# Patient Record
Sex: FEMALE | Race: WHITE | NOT HISPANIC OR LATINO | Employment: UNEMPLOYED | URBAN - METROPOLITAN AREA
[De-identification: names, ages, dates, MRNs, and addresses within clinical notes are randomized per-mention and may not be internally consistent; named-entity substitution may affect disease eponyms.]

---

## 2023-10-17 PROBLEM — M17.11 OSTEOARTHRITIS OF RIGHT KNEE: Status: ACTIVE | Noted: 2023-10-17

## 2023-10-17 PROBLEM — M25.561 PAIN IN RIGHT KNEE: Status: ACTIVE | Noted: 2023-10-17

## 2023-10-17 PROBLEM — I50.42 CHRONIC COMBINED SYSTOLIC AND DIASTOLIC CONGESTIVE HEART FAILURE (MULTI): Status: ACTIVE | Noted: 2023-10-17

## 2023-10-17 PROBLEM — F01.50 VASCULAR DEMENTIA WITHOUT BEHAVIORAL DISTURBANCE (MULTI): Status: ACTIVE | Noted: 2023-10-17

## 2023-10-17 PROBLEM — J30.2 SEASONAL ALLERGIES: Status: ACTIVE | Noted: 2023-10-17

## 2023-10-17 PROBLEM — G89.29 OTHER CHRONIC PAIN: Status: ACTIVE | Noted: 2023-10-17

## 2023-10-17 PROBLEM — F01.518 VASCULAR DEMENTIA WITH BEHAVIOR DISTURBANCE (MULTI): Status: ACTIVE | Noted: 2023-10-17

## 2023-10-17 PROBLEM — F51.05 INSOMNIA DUE TO OTHER MENTAL DISORDER: Status: ACTIVE | Noted: 2023-10-17

## 2023-10-17 PROBLEM — M17.0 ARTHRITIS OF BOTH KNEES: Status: ACTIVE | Noted: 2023-10-17

## 2023-10-17 PROBLEM — E11.9 DIABETES MELLITUS (MULTI): Status: ACTIVE | Noted: 2023-10-17

## 2023-10-17 PROBLEM — I48.19 PERSISTENT ATRIAL FIBRILLATION (MULTI): Status: ACTIVE | Noted: 2023-10-17

## 2023-10-17 PROBLEM — Z86.39 HISTORY OF HYPERGLYCEMIA: Status: ACTIVE | Noted: 2023-10-17

## 2023-10-17 PROBLEM — M17.12 OSTEOARTHRITIS OF LEFT KNEE: Status: ACTIVE | Noted: 2023-10-17

## 2023-10-17 PROBLEM — M25.562 PAIN IN LEFT KNEE: Status: ACTIVE | Noted: 2023-10-17

## 2023-10-17 PROBLEM — E78.5 HYPERLIPIDEMIA: Status: ACTIVE | Noted: 2023-10-17

## 2023-10-17 PROBLEM — F99 MENTAL DISORDER, NOT OTHERWISE SPECIFIED: Status: ACTIVE | Noted: 2023-10-17

## 2023-10-17 PROBLEM — R54 FRAIL ELDERLY: Status: ACTIVE | Noted: 2023-10-17

## 2023-10-17 PROBLEM — I83.893 VARICOSE VEINS OF BOTH LEGS WITH EDEMA: Status: ACTIVE | Noted: 2023-10-17

## 2023-10-17 PROBLEM — F99 INSOMNIA DUE TO OTHER MENTAL DISORDER: Status: ACTIVE | Noted: 2023-10-17

## 2023-10-17 PROBLEM — F41.8 SITUATIONAL ANXIETY: Status: ACTIVE | Noted: 2023-10-17

## 2023-10-17 PROBLEM — I48.92 ATRIAL FLUTTER (MULTI): Status: ACTIVE | Noted: 2023-10-17

## 2023-10-17 PROBLEM — K59.01 SLOW TRANSIT CONSTIPATION: Status: ACTIVE | Noted: 2023-10-17

## 2023-10-17 PROBLEM — E11.9 TYPE 2 DIABETES MELLITUS WITHOUT COMPLICATION (MULTI): Status: ACTIVE | Noted: 2023-10-17

## 2023-10-17 PROBLEM — I50.9 ACUTE ON CHRONIC HEART FAILURE (MULTI): Status: ACTIVE | Noted: 2023-10-17

## 2023-10-17 PROBLEM — I10 HYPERTENSION: Status: ACTIVE | Noted: 2023-10-17

## 2023-10-17 RX ORDER — QUETIAPINE FUMARATE 25 MG/1
25 TABLET, FILM COATED ORAL NIGHTLY
COMMUNITY

## 2023-10-17 RX ORDER — SPIRONOLACTONE 25 MG/1
25 TABLET ORAL DAILY
COMMUNITY

## 2023-10-17 RX ORDER — BUSPIRONE HYDROCHLORIDE 5 MG/1
5 TABLET ORAL 2 TIMES DAILY
COMMUNITY

## 2023-10-17 RX ORDER — GLIMEPIRIDE 4 MG/1
4 TABLET ORAL DAILY
COMMUNITY

## 2023-10-17 RX ORDER — LIDOCAINE HCL 4 G/100G
1 CREAM TOPICAL 3 TIMES DAILY
COMMUNITY
Start: 2022-02-05

## 2023-10-17 RX ORDER — TALC
3 POWDER (GRAM) TOPICAL NIGHTLY PRN
COMMUNITY

## 2023-10-17 RX ORDER — TORSEMIDE 20 MG/1
TABLET ORAL
COMMUNITY

## 2023-10-17 RX ORDER — DOCUSATE SODIUM 100 MG/1
100 CAPSULE, LIQUID FILLED ORAL DAILY PRN
COMMUNITY
Start: 2023-02-12

## 2023-10-17 RX ORDER — POTASSIUM CHLORIDE 20 MEQ/1
20 TABLET, EXTENDED RELEASE ORAL DAILY
COMMUNITY

## 2023-10-17 RX ORDER — LOSARTAN POTASSIUM 50 MG/1
50 TABLET ORAL DAILY
COMMUNITY

## 2023-10-17 RX ORDER — ADHESIVE BANDAGE
5 BANDAGE TOPICAL EVERY 6 HOURS
COMMUNITY

## 2023-10-17 RX ORDER — ACETAMINOPHEN 500 MG
500 TABLET ORAL
COMMUNITY

## 2023-10-17 RX ORDER — METOPROLOL TARTRATE 25 MG/1
25 TABLET, FILM COATED ORAL 2 TIMES DAILY
COMMUNITY

## 2023-12-10 PROBLEM — E04.1 THYROID NODULE: Status: ACTIVE | Noted: 2023-12-10

## 2023-12-10 PROBLEM — E55.9 VITAMIN D DEFICIENCY: Status: ACTIVE | Noted: 2023-12-10

## 2023-12-19 ENCOUNTER — OFFICE VISIT (OUTPATIENT)
Dept: PRIMARY CARE | Facility: CLINIC | Age: 88
End: 2023-12-19
Payer: MEDICARE

## 2023-12-19 DIAGNOSIS — I48.0 PAROXYSMAL ATRIAL FIBRILLATION (MULTI): Chronic | ICD-10-CM

## 2023-12-19 DIAGNOSIS — I11.0 HYPERTENSIVE HEART DISEASE WITH HEART FAILURE (MULTI): Chronic | ICD-10-CM

## 2023-12-19 DIAGNOSIS — F01.518 VASCULAR DEMENTIA WITH BEHAVIOR DISTURBANCE (MULTI): Chronic | ICD-10-CM

## 2023-12-19 DIAGNOSIS — J06.9 UPPER RESPIRATORY INFECTION WITH COUGH AND CONGESTION: Primary | ICD-10-CM

## 2023-12-19 PROCEDURE — 1126F AMNT PAIN NOTED NONE PRSNT: CPT | Performed by: NURSE PRACTITIONER

## 2023-12-19 PROCEDURE — 3074F SYST BP LT 130 MM HG: CPT | Performed by: NURSE PRACTITIONER

## 2023-12-19 PROCEDURE — 3078F DIAST BP <80 MM HG: CPT | Performed by: NURSE PRACTITIONER

## 2023-12-19 PROCEDURE — 99349 HOME/RES VST EST MOD MDM 40: CPT | Performed by: NURSE PRACTITIONER

## 2023-12-21 NOTE — PROGRESS NOTES
"Subjective   Patient ID: Roxanne Dodd is a 88 y.o. female who presents for No chief complaint on file..      Patient seen today in her private room at [facility]. She is awake and alert with appropriate awareness, pleasantly confused, poor historian, no longer ambulatory and is propelling self around in wheelchair. PMH: Post Covid-19, HTN, Gerd, Osteoarthritis, osteoporosis, OAB.      I was asked to see this patient today due to increased cough, congestion and overall lethargy and inability to get in and out of bed for the last seven days.  Reports that she feels as though she is getting worse instead of better.  Appetite has been poor, trying to stay hydrated.  Reports that coughing at times is keeping her awake at night.  Reports coughing up thick clear/tan secretions.  Denies shortness of breath, however reports that at times \"I cough so hard I can't catch my breath\".  She is no longer ambulatory and remains in her wheelchair throughout the day.  Requires assistance for all transfers.  Incontinent of bladder only.  Denies nausea or vomiting.  Multiple residents have had similar symptoms.  Covid19 test is negative.  Currently utilizing OTC mucinex with mild relief.  Denies chest pain, palpitations, tachycardia, and shortness of breath, no PND/orthopnea, or respiratory complaints for the patient. There is no fever, or chills. No nausea, vomiting, diarrhea, headaches, or vision changes or recent falls. There is no hematuria, dysuria, flank pain, or increased urgency.           Current Outpatient Medications:     acetaminophen (Tylenol) 500 mg tablet, Take 1 tablet (500 mg) by mouth., Disp: , Rfl:     apixaban (Eliquis) 5 mg tablet, Take 1 tablet (5 mg) by mouth 2 times a day., Disp: , Rfl:     busPIRone (Buspar) 5 mg tablet, Take 1 tablet (5 mg) by mouth 2 times a day., Disp: , Rfl:     diclofenac sodium 1 % kit, every 6 hours. as directed Externally Four times a day, Disp: , Rfl:     docusate sodium " (Colace) 100 mg capsule, Take 1 capsule (100 mg) by mouth once daily as needed for constipation., Disp: , Rfl:     glimepiride (Amaryl) 4 mg tablet, Take 1 tablet (4 mg) by mouth once daily., Disp: , Rfl:     lidocaine (lidocaine HCL) 4 % cream, Apply 0.1 g topically 3 times a day., Disp: , Rfl:     losartan (Cozaar) 50 mg tablet, Take 1 tablet (50 mg) by mouth once daily., Disp: , Rfl:     magnesium hydroxide (Milk of Magnesia) 400 mg/5 mL suspension, Take 5 mL by mouth every 6 hours. At least 4 hours between doses as needed Four times a day, Disp: , Rfl:     melatonin 3 mg tablet, Take 1 tablet (3 mg) by mouth as needed at bedtime for sleep., Disp: , Rfl:     metoprolol tartrate (Lopressor) 25 mg tablet, Take 1 tablet (25 mg) by mouth 2 times a day., Disp: , Rfl:     potassium chloride CR 20 mEq ER tablet, Take 1 tablet (20 mEq) by mouth once daily., Disp: , Rfl:     QUEtiapine (SEROquel) 25 mg tablet, Take 1 tablet (25 mg) by mouth once daily at bedtime., Disp: , Rfl:     spironolactone (Aldactone) 25 mg tablet, Take 1 tablet (25 mg) by mouth once daily., Disp: , Rfl:     torsemide (Demadex) 20 mg tablet, as directed Orally, Disp: , Rfl:      Review of Systems   Constitutional:  Positive for activity change and chills.   HENT:  Positive for congestion and sinus pressure. Negative for sneezing, sore throat and trouble swallowing.    Eyes:  Negative for discharge and redness.   Respiratory:  Positive for cough and wheezing. Negative for apnea, choking, chest tightness and shortness of breath.    Genitourinary:  Negative for difficulty urinating and frequency.   Neurological: Negative.    Psychiatric/Behavioral: Negative.         Objective   /68   Pulse 88   Temp 37.2 °C (99 °F) (Temporal)   Resp 16   SpO2 95%     Physical Exam  Constitutional:       Appearance: Normal appearance. She is ill-appearing.   HENT:      Head: Atraumatic.      Right Ear: Tympanic membrane normal.      Left Ear: Tympanic membrane  normal.      Nose: Congestion and rhinorrhea present.      Mouth/Throat:      Mouth: Mucous membranes are moist.      Pharynx: Oropharynx is clear.   Eyes:      Extraocular Movements: Extraocular movements intact.      Conjunctiva/sclera: Conjunctivae normal.      Pupils: Pupils are equal, round, and reactive to light.   Cardiovascular:      Rate and Rhythm: Normal rate and regular rhythm.      Pulses: Normal pulses.      Heart sounds: Normal heart sounds.   Pulmonary:      Effort: Pulmonary effort is normal. No respiratory distress.      Breath sounds: Wheezing present. No rhonchi.   Chest:      Chest wall: No tenderness.   Abdominal:      General: Bowel sounds are normal. There is no distension.      Palpations: Abdomen is soft.      Tenderness: There is no abdominal tenderness.   Musculoskeletal:         General: No swelling.      Cervical back: Neck supple. No tenderness.   Skin:     Coloration: Skin is pale.   Neurological:      Mental Status: She is alert and oriented to person, place, and time. Mental status is at baseline.       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       Sarai Martin, APRN-CNP

## 2023-12-22 VITALS
TEMPERATURE: 99 F | OXYGEN SATURATION: 95 % | DIASTOLIC BLOOD PRESSURE: 68 MMHG | HEART RATE: 88 BPM | SYSTOLIC BLOOD PRESSURE: 126 MMHG | RESPIRATION RATE: 16 BRPM

## 2023-12-22 PROBLEM — I48.0 PAROXYSMAL ATRIAL FIBRILLATION (MULTI): Status: ACTIVE | Noted: 2023-12-22

## 2023-12-22 PROBLEM — I11.0 HYPERTENSIVE HEART DISEASE WITH HEART FAILURE (MULTI): Status: ACTIVE | Noted: 2023-12-22

## 2023-12-22 PROBLEM — J06.9 UPPER RESPIRATORY INFECTION WITH COUGH AND CONGESTION: Status: ACTIVE | Noted: 2023-12-22

## 2023-12-22 ASSESSMENT — ENCOUNTER SYMPTOMS
CHOKING: 0
CHILLS: 1
ACTIVITY CHANGE: 1
FREQUENCY: 0
NEUROLOGICAL NEGATIVE: 1
WHEEZING: 1
SINUS PRESSURE: 1
SORE THROAT: 0
SHORTNESS OF BREATH: 0
DIFFICULTY URINATING: 0
PSYCHIATRIC NEGATIVE: 1
EYE DISCHARGE: 0
TROUBLE SWALLOWING: 0
COUGH: 1
CHEST TIGHTNESS: 0
EYE REDNESS: 0
APNEA: 0

## 2023-12-22 ASSESSMENT — PAIN SCALES - GENERAL: PAINLEVEL: 0-NO PAIN

## 2024-01-30 ENCOUNTER — OFFICE VISIT (OUTPATIENT)
Dept: PRIMARY CARE | Facility: CLINIC | Age: 89
End: 2024-01-30
Payer: MEDICARE

## 2024-01-30 VITALS
DIASTOLIC BLOOD PRESSURE: 68 MMHG | TEMPERATURE: 97.6 F | SYSTOLIC BLOOD PRESSURE: 126 MMHG | RESPIRATION RATE: 16 BRPM | OXYGEN SATURATION: 98 % | HEART RATE: 78 BPM

## 2024-01-30 DIAGNOSIS — F41.8 SITUATIONAL ANXIETY: ICD-10-CM

## 2024-01-30 DIAGNOSIS — J39.8 CONGESTION OF UPPER RESPIRATORY TRACT: ICD-10-CM

## 2024-01-30 DIAGNOSIS — I50.43 ACUTE ON CHRONIC COMBINED SYSTOLIC AND DIASTOLIC HEART FAILURE (MULTI): ICD-10-CM

## 2024-01-30 DIAGNOSIS — I48.4 ATYPICAL ATRIAL FLUTTER (MULTI): ICD-10-CM

## 2024-01-30 DIAGNOSIS — L89.221: ICD-10-CM

## 2024-01-30 DIAGNOSIS — I15.9 SECONDARY HYPERTENSION: Primary | ICD-10-CM

## 2024-01-30 DIAGNOSIS — M17.0 ARTHRITIS OF BOTH KNEES: ICD-10-CM

## 2024-01-30 PROCEDURE — 1125F AMNT PAIN NOTED PAIN PRSNT: CPT | Performed by: NURSE PRACTITIONER

## 2024-01-30 PROCEDURE — 3078F DIAST BP <80 MM HG: CPT | Performed by: NURSE PRACTITIONER

## 2024-01-30 PROCEDURE — 1157F ADVNC CARE PLAN IN RCRD: CPT | Performed by: NURSE PRACTITIONER

## 2024-01-30 PROCEDURE — 1159F MED LIST DOCD IN RCRD: CPT | Performed by: NURSE PRACTITIONER

## 2024-01-30 PROCEDURE — 99349 HOME/RES VST EST MOD MDM 40: CPT | Performed by: NURSE PRACTITIONER

## 2024-01-30 PROCEDURE — 1160F RVW MEDS BY RX/DR IN RCRD: CPT | Performed by: NURSE PRACTITIONER

## 2024-01-30 PROCEDURE — 3074F SYST BP LT 130 MM HG: CPT | Performed by: NURSE PRACTITIONER

## 2024-01-30 RX ORDER — MENTHOL AND ZINC OXIDE .44; 20.625 G/100G; G/100G
1 OINTMENT TOPICAL AS NEEDED
Qty: 60 G | Refills: 1 | Status: SHIPPED | OUTPATIENT
Start: 2024-01-30

## 2024-01-30 RX ORDER — GUAIFENESIN 600 MG/1
1200 TABLET, EXTENDED RELEASE ORAL 2 TIMES DAILY
Qty: 120 TABLET | Refills: 11 | Status: SHIPPED | OUTPATIENT
Start: 2024-01-30 | End: 2025-01-29

## 2024-01-30 ASSESSMENT — PAIN SCALES - GENERAL: PAINLEVEL: 4

## 2024-01-30 NOTE — PROGRESS NOTES
"Subjective   Patient ID: Rxoanne Dodd is a 88 y.o. female who presents for Follow-up (Thigh pain, rash).     Patient seen today in her private room at [facility]. She is awake and alert with appropriate awareness, pleasantly confused, poor historian, no longer ambulatory and is propelling self around in wheelchair. PMH: Post Covid-19, HTN, Gerd, Osteoarthritis, osteoporosis, OAB.    Patient had asked for an in person visit today.  She did not share with staff the nature of requested visit.  Patient is well known to this provider, and she reported \"I just feel comfortable sharing my concerns with you\".  Today she is having complaints of left posterior upper thigh discomfort.  She is describing as a burning sensation at time, and she noticed blood ting on her undergarments over the past several days.  States that she has cleaned herself up, however has not shared with staff her concerns.  She does appear to have a dime sized open wound, which is more of a skin tear from scooting on her bed and chair. Staff mentions that over the past three weeks she has refused her shower.  Patient states that \"it is to cold to shower\", and that's why she has chosen not to.  We've discussed risks and benefits of bathing and not bathing.  Another issue she has mentioned is her rash to arms and legs, which are more of an eczema type patches to her skin.  Currently using Triaminicol cream whish has helped, and has relieved the itching.  Appetite is reported as good.  Reports bowels as regular and is urinating adequate amounts.  Another issue she has mentioned is phlegm - reports that she is constantly coughing up thick phlegm which is white/non colored, but does state at times \"I just can't cough it up\".  She is capable of transferring on her own, requires assistance with dressing and bathing.  Otherwise is mainly independent with her ADL's.  Denies chest pain, palpitations, tachycardia, and shortness of breath, wheezing, no " PND/orthopnea, or respiratory complaints for the patient. There is no fever, or chills. No nausea, vomiting, diarrhea, headaches, or vision changes or recent falls. There is no hematuria, dysuria, flank pain, or increased urgency.           Current Outpatient Medications:     acetaminophen (Tylenol) 500 mg tablet, Take 1 tablet (500 mg) by mouth., Disp: , Rfl:     apixaban (Eliquis) 5 mg tablet, Take 1 tablet (5 mg) by mouth 2 times a day., Disp: , Rfl:     busPIRone (Buspar) 5 mg tablet, Take 1 tablet (5 mg) by mouth 2 times a day., Disp: , Rfl:     diclofenac sodium 1 % kit, every 6 hours. as directed Externally Four times a day, Disp: , Rfl:     docusate sodium (Colace) 100 mg capsule, Take 1 capsule (100 mg) by mouth once daily as needed for constipation., Disp: , Rfl:     glimepiride (Amaryl) 4 mg tablet, Take 1 tablet (4 mg) by mouth once daily., Disp: , Rfl:     guaiFENesin (Mucinex) 600 mg 12 hr tablet, Take 2 tablets (1,200 mg) by mouth 2 times a day. Do not crush, chew, or split., Disp: 120 tablet, Rfl: 11    lidocaine (lidocaine HCL) 4 % cream, Apply 0.1 g topically 3 times a day., Disp: , Rfl:     losartan (Cozaar) 50 mg tablet, Take 1 tablet (50 mg) by mouth once daily., Disp: , Rfl:     magnesium hydroxide (Milk of Magnesia) 400 mg/5 mL suspension, Take 5 mL by mouth every 6 hours. At least 4 hours between doses as needed Four times a day, Disp: , Rfl:     melatonin 3 mg tablet, Take 1 tablet (3 mg) by mouth as needed at bedtime for sleep., Disp: , Rfl:     menthol-zinc oxide (Calmoseptine) 0.44-20.6 % ointment, Apply 1 Application topically if needed for irritation., Disp: 60 g, Rfl: 1    metoprolol tartrate (Lopressor) 25 mg tablet, Take 1 tablet (25 mg) by mouth 2 times a day., Disp: , Rfl:     potassium chloride CR 20 mEq ER tablet, Take 1 tablet (20 mEq) by mouth once daily., Disp: , Rfl:     QUEtiapine (SEROquel) 25 mg tablet, Take 1 tablet (25 mg) by mouth once daily at bedtime., Disp: , Rfl:      spironolactone (Aldactone) 25 mg tablet, Take 1 tablet (25 mg) by mouth once daily., Disp: , Rfl:     torsemide (Demadex) 20 mg tablet, as directed Orally, Disp: , Rfl:      Review of Systems  Constitutional:  Negative for activity change   HENT:  Negative for sneezing, sore throat and trouble swallowing.    Eyes:  Negative for discharge and redness.   Respiratory:  Negative for apnea, choking, chest tightness and shortness of breath.    Genitourinary:  Negative for difficulty urinating and frequency.   Neurological: Negative.    Psychiatric/Behavioral: Negative.       Objective   /68   Pulse 78   Temp 36.4 °C (97.6 °F) (Temporal)   Resp 16   SpO2 98%     Physical Exam  Constitutional:       Appearance: Normal appearance.   HENT:      Head: Atraumatic.      Right Ear: Tympanic membrane normal.      Left Ear: Tympanic membrane normal.      Nose: clear     Mouth/Throat: clear      Mouth: Mucous membranes are moist.      Pharynx: Oropharynx is clear.   Eyes:      Extraocular Movements: Extraocular movements intact.      Conjunctiva/sclera: Conjunctivae normal.      Pupils: Pupils are equal, round, and reactive to light.   Cardiovascular:      Rate and Rhythm: Normal rate and regular rhythm.      Pulses: Normal pulses.      Heart sounds: Normal heart sounds.   Pulmonary:      Effort: Pulmonary effort is normal. No respiratory distress.      Breath sounds: lung sounds clear bilaterally  Chest:      Chest wall: No tenderness.   Abdominal:      General: Bowel sounds are normal. There is no distension.      Palpations: Abdomen is soft.      Tenderness: There is no abdominal tenderness.   Musculoskeletal:         General: No swelling.      Cervical back: Neck supple. No tenderness.   Skin:     Coloration: Skin is pale.   Neurological:      Mental Status: She is alert and oriented to person, place, and time. Mental status is at baseline.     Assessment/Plan   Problem List Items Addressed This Visit              ICD-10-CM    Hypertension - Primary I10    Arthritis of both knees M17.0    Situational anxiety F41.8    Acute on chronic heart failure (CMS/[facility]) I50.9    Atrial flutter (CMS/[facility]) I48.92    Congestion of upper respiratory tract J39.8    Relevant Medications    guaiFENesin (Mucinex) 600 mg 12 hr tablet     Other Visit Diagnoses         Codes    Pressure injury of left thigh, stage 1     L89.221    Relevant Medications    menthol-zinc oxide (Calmoseptine) 0.44-20.6 % ointment          More than 50% of time spent in face-to-face discussion @ a total of 40 minutes with this patient on counseling, coordination of care, collaboration with family and staff and review of medical records and diagnostics.   Daughter in agreement with POC    Upper thigh wound - Encouraged regular bathing - open area apply calmoseptine to open area, pad and protect - dressing to be changed daily.  Increased congestion - ok to start mucinex 600mg daily           Sarai Martin, APRN-CNP

## 2024-02-13 ENCOUNTER — OFFICE VISIT (OUTPATIENT)
Dept: PRIMARY CARE | Facility: CLINIC | Age: 89
End: 2024-02-13
Payer: MEDICARE

## 2024-02-13 DIAGNOSIS — I50.43 ACUTE ON CHRONIC COMBINED SYSTOLIC AND DIASTOLIC HEART FAILURE (MULTI): Primary | ICD-10-CM

## 2024-02-13 DIAGNOSIS — M17.0 ARTHRITIS OF BOTH KNEES: Chronic | ICD-10-CM

## 2024-02-13 DIAGNOSIS — I48.4 ATYPICAL ATRIAL FLUTTER (MULTI): Chronic | ICD-10-CM

## 2024-02-13 DIAGNOSIS — S31.000D SACRAL WOUND, SUBSEQUENT ENCOUNTER: ICD-10-CM

## 2024-02-13 DIAGNOSIS — K59.01 SLOW TRANSIT CONSTIPATION: Chronic | ICD-10-CM

## 2024-02-13 PROCEDURE — 1126F AMNT PAIN NOTED NONE PRSNT: CPT | Performed by: NURSE PRACTITIONER

## 2024-02-13 PROCEDURE — 99348 HOME/RES VST EST LOW MDM 30: CPT | Performed by: NURSE PRACTITIONER

## 2024-02-13 PROCEDURE — 1157F ADVNC CARE PLAN IN RCRD: CPT | Performed by: NURSE PRACTITIONER

## 2024-02-13 PROCEDURE — 3074F SYST BP LT 130 MM HG: CPT | Performed by: NURSE PRACTITIONER

## 2024-02-13 PROCEDURE — 1160F RVW MEDS BY RX/DR IN RCRD: CPT | Performed by: NURSE PRACTITIONER

## 2024-02-13 PROCEDURE — 1159F MED LIST DOCD IN RCRD: CPT | Performed by: NURSE PRACTITIONER

## 2024-02-13 PROCEDURE — 3078F DIAST BP <80 MM HG: CPT | Performed by: NURSE PRACTITIONER

## 2024-02-13 RX ORDER — TRIAMCINOLONE ACETONIDE 0.25 MG/G
1 CREAM TOPICAL 2 TIMES DAILY
COMMUNITY

## 2024-02-13 NOTE — PROGRESS NOTES
"Subjective   Patient ID: Roxanne Dodd is a 88 y.o. female who presents for Follow-up (Functional decline, worsening wound).    Patient seen today in her private room at [facility]. She is awake and alert with appropriate awareness, pleasantly confused at times, no longer ambulatory, unable to bear weight and is propelling self around in wheelchair. PMH: Post Covid-19, HTN, Gerd, Osteoarthritis, osteoporosis, OAB.    F2F - Wheelchair - Due to osteoarthritis of bilateral knees, patient is not ambulatory, no longer able to bear weight, history of falls and assist x1 person with all transfers.She can assist with transfer, however only to pivot.  Due to non-weight bearing she is not able to utilize cane or walker.  Depedent on all ADL's, Staff is able to assist with transporting wheelchair, and patient has the ability to propel herself around.      Patient seen today for follow up of skin rash, and sacral wound.  Reports that wound \"I think is getting better\".  Reports that she does have new depends which are softer and seems to fit better, thinks it has helped.  After assessment of wound, it actually appears slightly more open and not healing, coverage of wound is not consistent and is not relieving pressure to that area.   Admits to not keeping wound covered or sharing with staff \"that something felt pinchy\".  Rash to arms and legs remains about the same.  She reports using triamcinolone cream intermittently and is self administering.  Bathing has been on a regular scheduled basis. Denies chest pain, palpitations, tachycardia, and shortness of breath, wheezing, no PND/orthopnea, or respiratory complaints for the patient. There is no fever, or chills. No nausea, vomiting, diarrhea, headaches, or vision changes or recent falls. There is no hematuria, dysuria, flank pain, or increased urgency.         Current Outpatient Medications:     acetaminophen (Tylenol) 500 mg tablet, Take 1 tablet (500 mg) by mouth., " Disp: , Rfl:     apixaban (Eliquis) 5 mg tablet, Take 1 tablet (5 mg) by mouth 2 times a day., Disp: , Rfl:     busPIRone (Buspar) 5 mg tablet, Take 1 tablet (5 mg) by mouth 2 times a day., Disp: , Rfl:     diclofenac sodium 1 % kit, every 6 hours. as directed Externally Four times a day, Disp: , Rfl:     docusate sodium (Colace) 100 mg capsule, Take 1 capsule (100 mg) by mouth once daily as needed for constipation., Disp: , Rfl:     glimepiride (Amaryl) 4 mg tablet, Take 1 tablet (4 mg) by mouth once daily., Disp: , Rfl:     guaiFENesin (Mucinex) 600 mg 12 hr tablet, Take 2 tablets (1,200 mg) by mouth 2 times a day. Do not crush, chew, or split., Disp: 120 tablet, Rfl: 11    lidocaine (lidocaine HCL) 4 % cream, Apply 0.1 g topically 3 times a day., Disp: , Rfl:     losartan (Cozaar) 50 mg tablet, Take 1 tablet (50 mg) by mouth once daily., Disp: , Rfl:     magnesium hydroxide (Milk of Magnesia) 400 mg/5 mL suspension, Take 5 mL by mouth every 6 hours. At least 4 hours between doses as needed Four times a day, Disp: , Rfl:     melatonin 3 mg tablet, Take 1 tablet (3 mg) by mouth as needed at bedtime for sleep., Disp: , Rfl:     menthol-zinc oxide (Calmoseptine) 0.44-20.6 % ointment, Apply 1 Application topically if needed for irritation., Disp: 60 g, Rfl: 1    metoprolol tartrate (Lopressor) 25 mg tablet, Take 1 tablet (25 mg) by mouth 2 times a day., Disp: , Rfl:     potassium chloride CR 20 mEq ER tablet, Take 1 tablet (20 mEq) by mouth once daily., Disp: , Rfl:     QUEtiapine (SEROquel) 25 mg tablet, Take 1 tablet (25 mg) by mouth once daily at bedtime., Disp: , Rfl:     spironolactone (Aldactone) 25 mg tablet, Take 1 tablet (25 mg) by mouth once daily., Disp: , Rfl:     torsemide (Demadex) 20 mg tablet, as directed Orally, Disp: , Rfl:     triamcinolone (Kenalog) 0.025 % cream, Apply 1 Application topically 2 times a day., Disp: , Rfl:      Review of Systems  Constitutional:  Negative for activity change   HENT:   Negative for sneezing, sore throat and trouble swallowing.    Eyes:  Negative for discharge and redness.   Respiratory:  Negative for apnea, choking, chest tightness and shortness of breath.    Genitourinary:  Negative for difficulty urinating and frequency.   Neurological: Negative.    Psychiatric/Behavioral: Negative.  Constitutional:  Positive for activity change and chills.     Objective   /68   Pulse 66   Temp 36.1 °C (97 °F) (Temporal)   Resp 16   SpO2 97%     Physical Exam  Constitutional:       General: She is not in acute distress.     Appearance: Normal appearance. She is not ill-appearing.   HENT:      Head: Normocephalic and atraumatic.   Eyes:      Extraocular Movements: Extraocular movements intact.      Pupils: Pupils are equal, round, and reactive to light.   Cardiovascular:      Rate and Rhythm: Normal rate and regular rhythm.      Pulses: Normal pulses.      Heart sounds: Normal heart sounds.   Pulmonary:      Effort: Pulmonary effort is normal.      Breath sounds: Normal breath sounds.   Abdominal:      General: Bowel sounds are normal.      Palpations: Abdomen is soft.   Musculoskeletal:      Cervical back: Neck supple. No tenderness.   Skin:     Comments: Petechia bilateral arms and legs.  Wound to upper thigh open - appx 5cm around - tender - not bleeding no s/s of infection   Neurological:      Mental Status: She is alert.   Psychiatric:         Mood and Affect: Mood normal.         Assessment/Plan   Diagnoses and all orders for this visit:  Acute on chronic combined systolic and diastolic heart failure (CMS/HCC)  Comments:  managed with spironolactone 25mg daily, losartan 50mg daily, torsemide 20mg daily  Atypical atrial flutter (CMS/HCC)  Comments:  Managed - continue eliquis 5mg bid  Slow transit constipation  Comments:  Managed - continue colace 100mg daily  Sacral wound, subsequent encounter  Comments:  Continue to pad and protect - may consider gel cushion to relieve pressure  to area  Arthritis of both knees  Comments:  Wheelchair ordered for all transport and propelling self around      Great deal of education regarding shifting weight to relieve pressure to back of thigh.  Wound - Pad and protect with daily dressing changes and PRN - staff aware     Functional decline - wheelchair ordered from alexsandra DUCKWORTH Kityuli, APRN-CNP

## 2024-02-17 VITALS
DIASTOLIC BLOOD PRESSURE: 68 MMHG | OXYGEN SATURATION: 97 % | TEMPERATURE: 97 F | HEART RATE: 66 BPM | RESPIRATION RATE: 16 BRPM | SYSTOLIC BLOOD PRESSURE: 126 MMHG

## 2024-02-17 ASSESSMENT — PAIN SCALES - GENERAL: PAINLEVEL: 0-NO PAIN

## 2024-02-20 PROBLEM — S31.000D SACRAL WOUND, SUBSEQUENT ENCOUNTER: Status: ACTIVE | Noted: 2024-02-20

## 2024-02-21 ENCOUNTER — TELEPHONE (OUTPATIENT)
Dept: PRIMARY CARE | Facility: CLINIC | Age: 89
End: 2024-02-21
Payer: MEDICARE

## 2024-03-19 ENCOUNTER — OFFICE VISIT (OUTPATIENT)
Dept: PRIMARY CARE | Facility: CLINIC | Age: 89
End: 2024-03-19
Payer: MEDICARE

## 2024-03-19 DIAGNOSIS — M17.0 ARTHRITIS OF BOTH KNEES: Chronic | ICD-10-CM

## 2024-03-19 DIAGNOSIS — I15.9 SECONDARY HYPERTENSION: Chronic | ICD-10-CM

## 2024-03-19 DIAGNOSIS — I50.43 ACUTE ON CHRONIC COMBINED SYSTOLIC AND DIASTOLIC HEART FAILURE (MULTI): Primary | ICD-10-CM

## 2024-03-19 DIAGNOSIS — S31.000D SACRAL WOUND, SUBSEQUENT ENCOUNTER: Chronic | ICD-10-CM

## 2024-03-19 DIAGNOSIS — L20.84 INTRINSIC ECZEMA: Chronic | ICD-10-CM

## 2024-03-19 PROCEDURE — 3078F DIAST BP <80 MM HG: CPT | Performed by: NURSE PRACTITIONER

## 2024-03-19 PROCEDURE — 3075F SYST BP GE 130 - 139MM HG: CPT | Performed by: NURSE PRACTITIONER

## 2024-03-19 PROCEDURE — 1126F AMNT PAIN NOTED NONE PRSNT: CPT | Performed by: NURSE PRACTITIONER

## 2024-03-19 PROCEDURE — 1159F MED LIST DOCD IN RCRD: CPT | Performed by: NURSE PRACTITIONER

## 2024-03-19 PROCEDURE — 1157F ADVNC CARE PLAN IN RCRD: CPT | Performed by: NURSE PRACTITIONER

## 2024-03-19 PROCEDURE — 99348 HOME/RES VST EST LOW MDM 30: CPT | Performed by: NURSE PRACTITIONER

## 2024-03-19 PROCEDURE — 1160F RVW MEDS BY RX/DR IN RCRD: CPT | Performed by: NURSE PRACTITIONER

## 2024-03-21 VITALS
RESPIRATION RATE: 16 BRPM | DIASTOLIC BLOOD PRESSURE: 78 MMHG | OXYGEN SATURATION: 96 % | HEART RATE: 74 BPM | TEMPERATURE: 96.8 F | SYSTOLIC BLOOD PRESSURE: 136 MMHG

## 2024-03-21 ASSESSMENT — PAIN SCALES - GENERAL: PAINLEVEL: 0-NO PAIN

## 2024-03-21 NOTE — PROGRESS NOTES
"j\\Subjective   Patient ID: Roxanne Dodd is a 88 y.o. female who presents for No chief complaint on file..    Patient seen today in her private room at [facility]. She is awake and alert with appropriate awareness, pleasantly confused at times, no longer ambulatory, unable to bear weight and is propelling self around in wheelchair. PMH: Post Covid-19, HTN, Gerd, Osteoarthritis, osteoporosis, OAB.    I was asked to see this patient today due to reported increased \"itchy\" spots, and a \"pinching\" spot that has returned on the back of her thigh.  Dependent on all ADL's, requiring assistance for transferring.  Remains in her wheelchair most of the day and is able to propel herself around.  Continent of bowel and bladder does have intermittent episodes of urinary incontinence.  Spots appear to be from worsening eczema on her bilateral arms and hands.  Reports that she is using triaminiconlone cream daily as instructed.  Wound to thigh appears to have opened.  Does not appear to be bleeding at this time, however reports that she has noted small amounts of blood on her depends over the past week.  Appetite is reported as good.  Admits that she is sleeping well, and keeping herself busy.  Denies chest pain, palpitations, tachycardia, and shortness of breath, wheezing, no PND/orthopnea, or respiratory complaints for the patient. There is no fever, or chills. No nausea, vomiting, diarrhea, headaches, or vision changes or recent falls. There is no hematuria, dysuria, flank pain, or increased urgency.           Current Outpatient Medications:     acetaminophen (Tylenol) 500 mg tablet, Take 1 tablet (500 mg) by mouth., Disp: , Rfl:     apixaban (Eliquis) 5 mg tablet, Take 1 tablet (5 mg) by mouth 2 times a day., Disp: , Rfl:     busPIRone (Buspar) 5 mg tablet, Take 1 tablet (5 mg) by mouth 2 times a day., Disp: , Rfl:     diclofenac sodium 1 % kit, every 6 hours. as directed Externally Four times a day, Disp: , Rfl: "     docusate sodium (Colace) 100 mg capsule, Take 1 capsule (100 mg) by mouth once daily as needed for constipation., Disp: , Rfl:     glimepiride (Amaryl) 4 mg tablet, Take 1 tablet (4 mg) by mouth once daily., Disp: , Rfl:     guaiFENesin (Mucinex) 600 mg 12 hr tablet, Take 2 tablets (1,200 mg) by mouth 2 times a day. Do not crush, chew, or split., Disp: 120 tablet, Rfl: 11    lidocaine (lidocaine HCL) 4 % cream, Apply 0.1 g topically 3 times a day., Disp: , Rfl:     losartan (Cozaar) 50 mg tablet, Take 1 tablet (50 mg) by mouth once daily., Disp: , Rfl:     magnesium hydroxide (Milk of Magnesia) 400 mg/5 mL suspension, Take 5 mL by mouth every 6 hours. At least 4 hours between doses as needed Four times a day, Disp: , Rfl:     melatonin 3 mg tablet, Take 1 tablet (3 mg) by mouth as needed at bedtime for sleep., Disp: , Rfl:     menthol-zinc oxide (Calmoseptine) 0.44-20.6 % ointment, Apply 1 Application topically if needed for irritation., Disp: 60 g, Rfl: 1    metoprolol tartrate (Lopressor) 25 mg tablet, Take 1 tablet (25 mg) by mouth 2 times a day., Disp: , Rfl:     potassium chloride CR 20 mEq ER tablet, Take 1 tablet (20 mEq) by mouth once daily., Disp: , Rfl:     QUEtiapine (SEROquel) 25 mg tablet, Take 1 tablet (25 mg) by mouth once daily at bedtime., Disp: , Rfl:     spironolactone (Aldactone) 25 mg tablet, Take 1 tablet (25 mg) by mouth once daily., Disp: , Rfl:     torsemide (Demadex) 20 mg tablet, as directed Orally, Disp: , Rfl:     triamcinolone (Kenalog) 0.025 % cream, Apply 1 Application topically 2 times a day., Disp: , Rfl:      Review of Systems  Constitutional:  Negative for activity change   HENT:  Negative for sneezing, sore throat and trouble swallowing.    Eyes:  Negative for discharge and redness.   Respiratory:  Negative for apnea, choking, chest tightness and shortness of breath.    Genitourinary:  Negative for difficulty urinating and frequency.   Neurological: Negative.     Psychiatric/Behavioral: Negative.  Constitutional:  Positive for activity change and chills.   Objective   /78   Pulse 74   Temp 36 °C (96.8 °F)   Resp 16   SpO2 96%     Physical Exam  Constitutional:       General: She is not in acute distress.     Appearance: Normal appearance. She is not ill-appearing.   HENT:      Head: Normocephalic and atraumatic.   Eyes:      Extraocular Movements: Extraocular movements intact.      Pupils: Pupils are equal, round, and reactive to light.   Cardiovascular:      Rate and Rhythm: Normal rate and regular rhythm.      Pulses: Normal pulses.      Heart sounds: Normal heart sounds.   Pulmonary:      Effort: Pulmonary effort is normal.      Breath sounds: Normal breath sounds.   Abdominal:      General: Bowel sounds are normal.      Palpations: Abdomen is soft.   Musculoskeletal:      Cervical back: Neck supple. No tenderness.   Skin:     Comments: Petechia bilateral arms and legs.  Wound to upper thigh open - appx 5cm around - tender - not bleeding no s/s of infection   Neurological:      Mental Status: She is alert.   Psychiatric:         Mood and Affect: Mood normal.   Assessment/Plan   Diagnoses and all orders for this visit:  Acute on chronic combined systolic and diastolic heart failure (CMS/HCC)  Comments:  Managed - continue torsemide 20mg daily, spironolactone 25mg daily  Secondary hypertension  Comments:  Managed continue metoprolol 25mg daily, losartan 50mg daily  Arthritis of both knees  Comments:  Stable - no longera ambulatory - continue supportive care, tylenol 500mg Q6hrs prn  Sacral wound, subsequent encounter  Comments:  Keep area clean, dry, pad and protect daily  Intrinsic eczema  Comments:  Managed - continue triamcinolone cream bid               Sarai Martin, APRN-CNP

## 2024-05-06 NOTE — PROGRESS NOTES
Spoke to Serina at [facility] who states it was transferred to the [facility] d/t pt living in [place]. She reviewed chart but it is marked as cancelled. She states she will figure out what happened and get back to me.

## 2024-05-21 ENCOUNTER — OFFICE VISIT (OUTPATIENT)
Dept: PRIMARY CARE | Facility: CLINIC | Age: 89
End: 2024-05-21
Payer: MEDICARE

## 2024-05-21 DIAGNOSIS — M17.0 ARTHRITIS OF BOTH KNEES: Chronic | ICD-10-CM

## 2024-05-21 DIAGNOSIS — I50.42 CHRONIC COMBINED SYSTOLIC AND DIASTOLIC CONGESTIVE HEART FAILURE (MULTI): Chronic | ICD-10-CM

## 2024-05-21 DIAGNOSIS — I48.4 ATYPICAL ATRIAL FLUTTER (MULTI): Primary | ICD-10-CM

## 2024-05-21 DIAGNOSIS — E11.9 TYPE 2 DIABETES MELLITUS WITHOUT COMPLICATION, WITHOUT LONG-TERM CURRENT USE OF INSULIN (MULTI): Chronic | ICD-10-CM

## 2024-05-21 DIAGNOSIS — F01.50 VASCULAR DEMENTIA WITHOUT BEHAVIORAL DISTURBANCE (MULTI): ICD-10-CM

## 2024-05-21 PROCEDURE — 1160F RVW MEDS BY RX/DR IN RCRD: CPT | Performed by: NURSE PRACTITIONER

## 2024-05-21 PROCEDURE — 1126F AMNT PAIN NOTED NONE PRSNT: CPT | Performed by: NURSE PRACTITIONER

## 2024-05-21 PROCEDURE — 3078F DIAST BP <80 MM HG: CPT | Performed by: NURSE PRACTITIONER

## 2024-05-21 PROCEDURE — 3074F SYST BP LT 130 MM HG: CPT | Performed by: NURSE PRACTITIONER

## 2024-05-21 PROCEDURE — 1157F ADVNC CARE PLAN IN RCRD: CPT | Performed by: NURSE PRACTITIONER

## 2024-05-21 PROCEDURE — 1159F MED LIST DOCD IN RCRD: CPT | Performed by: NURSE PRACTITIONER

## 2024-05-21 PROCEDURE — 99348 HOME/RES VST EST LOW MDM 30: CPT | Performed by: NURSE PRACTITIONER

## 2024-05-21 ASSESSMENT — PAIN SCALES - GENERAL: PAINLEVEL: 0-NO PAIN

## 2024-05-21 NOTE — PROGRESS NOTES
Subjective   Patient ID: Roxanne Dodd is a 88 y.o. female who presents for Follow-up (new wheelchair, HTN, DMII).    Patient seen today in her private room at [facility]. She is awake and alert with appropriate awareness, pleasantly confused at times, no longer ambulatory, unable to bear weight and remains in transport chair. PMH: Post Covid-19, HTN, Gerd, Osteoarthritis, osteoporosis, OAB.    I was asked to see this patient today due to refusal of new wheelchair that has been received.  Patient is non-ambulatory and a risk for falls, due to weakness and knee pain.  Recently ordered a new wheelchair as she is using an older transport chair, and is dependent on staff to transport around facility.  Hopeful that a wheelchair would provide more independence and have the ability to propel herself. Today she is upset and reports that the wheelchair is uncomfortable and she will not use.  Notable that due to her hip girth when sitting that sides are touching, and she does not feel it is comfortable.  We've discussed benefits of utilizing chair, however she is not interested.   Suggestions of PT/OT, however patient is refusing.  Staff reports that she is normally cooperative, and pleasant.  Engaging in activities and meals with residents.  Bowels are reported as fairly regular, does have intermittent urinary incontinence and wears depends.  Reports that sacral wound has resolved, however admits to groin rash resolved with cream.  Denies chest pain, palpitations, tachycardia, and shortness of breath, wheezing, no PND/orthopnea, or respiratory complaints for the patient. There is no fever, or chills. No nausea, vomiting, diarrhea, headaches, or vision changes or recent falls. There is no hematuria, dysuria, flank pain, or increased urgency.           Current Outpatient Medications:     acetaminophen (Tylenol) 500 mg tablet, Take 1 tablet (500 mg) by mouth., Disp: , Rfl:     apixaban (Eliquis) 5 mg tablet, Take 1  tablet (5 mg) by mouth 2 times a day., Disp: , Rfl:     busPIRone (Buspar) 5 mg tablet, Take 1 tablet (5 mg) by mouth 2 times a day., Disp: , Rfl:     diclofenac sodium 1 % kit, every 6 hours. as directed Externally Four times a day, Disp: , Rfl:     docusate sodium (Colace) 100 mg capsule, Take 1 capsule (100 mg) by mouth once daily as needed for constipation., Disp: , Rfl:     glimepiride (Amaryl) 4 mg tablet, Take 1 tablet (4 mg) by mouth once daily., Disp: , Rfl:     guaiFENesin (Mucinex) 600 mg 12 hr tablet, Take 2 tablets (1,200 mg) by mouth 2 times a day. Do not crush, chew, or split., Disp: 120 tablet, Rfl: 11    lidocaine (lidocaine HCL) 4 % cream, Apply 0.1 g topically 3 times a day., Disp: , Rfl:     losartan (Cozaar) 50 mg tablet, Take 1 tablet (50 mg) by mouth once daily., Disp: , Rfl:     magnesium hydroxide (Milk of Magnesia) 400 mg/5 mL suspension, Take 5 mL by mouth every 6 hours. At least 4 hours between doses as needed Four times a day, Disp: , Rfl:     melatonin 3 mg tablet, Take 1 tablet (3 mg) by mouth as needed at bedtime for sleep., Disp: , Rfl:     menthol-zinc oxide (Calmoseptine) 0.44-20.6 % ointment, Apply 1 Application topically if needed for irritation., Disp: 60 g, Rfl: 1    metoprolol tartrate (Lopressor) 25 mg tablet, Take 1 tablet (25 mg) by mouth 2 times a day., Disp: , Rfl:     potassium chloride CR 20 mEq ER tablet, Take 1 tablet (20 mEq) by mouth once daily., Disp: , Rfl:     QUEtiapine (SEROquel) 25 mg tablet, Take 1 tablet (25 mg) by mouth once daily at bedtime., Disp: , Rfl:     spironolactone (Aldactone) 25 mg tablet, Take 1 tablet (25 mg) by mouth once daily., Disp: , Rfl:     torsemide (Demadex) 20 mg tablet, as directed Orally, Disp: , Rfl:     triamcinolone (Kenalog) 0.025 % cream, Apply 1 Application topically 2 times a day., Disp: , Rfl:      Review of Systems  HENT:  Negative for sneezing, sore throat and trouble swallowing.    Eyes:  Negative for discharge and  "redness.   Respiratory:  Negative for apnea, choking, chest tightness and shortness of breath.    Genitourinary:  Negative for difficulty urinating and frequency.   Neurological: Negative.    Psychiatric/Behavioral: Negative.  Constitutional:  Positive for activity change and chills.     Objective   /68 (BP Location: Left arm, Patient Position: Sitting)   Pulse 68   Temp 35.9 °C (96.7 °F) (Temporal)   Resp 16   Ht 1.727 m (5' 8\")   Wt 80.7 kg (178 lb)   SpO2 97%   BMI 27.06 kg/m²     Physical Exam    General: She is not in acute distress.     Appearance: Normal appearance. She is not ill-appearing.   HENT:      Head: Normocephalic and atraumatic.   Eyes:      Extraocular Movements: Extraocular movements intact.      Pupils: Pupils are equal, round, and reactive to light.   Cardiovascular:      Rate and Rhythm: Normal rate and regular rhythm.      Pulses: Normal pulses.      Heart sounds: Normal heart sounds.   Pulmonary:      Effort: Pulmonary effort is normal.      Breath sounds: Normal breath sounds.   Abdominal:      General: Bowel sounds are normal.      Palpations: Abdomen is soft.   Musculoskeletal:      Cervical back: Neck supple. No tenderness.   Skin:     Comments: Petechia bilateral arms and legs.    Neurological:      Mental Status: She is alert.   Psychiatric:         Mood and Affect: Mood normal.     Assessment/Plan   Diagnoses and all orders for this visit:  Atypical atrial flutter (Multi)  Comments:  Managed - continue eliquis 5mg bid  Chronic combined systolic and diastolic congestive heart failure (Multi)  Comments:  Managed - continue torsemide 20mg daily, potassium 20meq daily  Type 2 diabetes mellitus without complication, without long-term current use of insulin (Multi)  Comments:  Managed - continue glimepiride 4mg daily  Arthritis of both knees  Comments:  Continue use of wheelchair for all transport, supportive care, tylenol 500mg bid, lidocaine rub PRN  Vascular dementia without " behavioral disturbance (Multi)  Comments:  Continue quentiapine 25mg Qhs      More than 50% of time spent in face-to-face discussion @ a total of 30 minutes with this patient on counseling, coordination of care, collaboration with family and staff and review of medical records and diagnostics.  Collaboration with daughter regarding use of wheelchair - daughter and son will discuss with patient.         Sarai Martin, APRN-CNP

## 2024-05-27 VITALS
RESPIRATION RATE: 16 BRPM | TEMPERATURE: 96.7 F | HEIGHT: 68 IN | HEART RATE: 68 BPM | DIASTOLIC BLOOD PRESSURE: 68 MMHG | SYSTOLIC BLOOD PRESSURE: 126 MMHG | WEIGHT: 178 LBS | OXYGEN SATURATION: 97 % | BODY MASS INDEX: 26.98 KG/M2

## 2024-07-02 ENCOUNTER — OFFICE VISIT (OUTPATIENT)
Dept: PRIMARY CARE | Facility: CLINIC | Age: 89
End: 2024-07-02
Payer: MEDICARE

## 2024-07-02 DIAGNOSIS — M17.0 ARTHRITIS OF BOTH KNEES: Chronic | ICD-10-CM

## 2024-07-02 DIAGNOSIS — B36.9 FUNGAL RASH OF TORSO: Primary | ICD-10-CM

## 2024-07-02 DIAGNOSIS — F01.518 VASCULAR DEMENTIA WITH BEHAVIOR DISTURBANCE (MULTI): Chronic | ICD-10-CM

## 2024-07-02 PROCEDURE — 3078F DIAST BP <80 MM HG: CPT | Performed by: NURSE PRACTITIONER

## 2024-07-02 PROCEDURE — 99348 HOME/RES VST EST LOW MDM 30: CPT | Performed by: NURSE PRACTITIONER

## 2024-07-02 PROCEDURE — 1157F ADVNC CARE PLAN IN RCRD: CPT | Performed by: NURSE PRACTITIONER

## 2024-07-02 PROCEDURE — 3074F SYST BP LT 130 MM HG: CPT | Performed by: NURSE PRACTITIONER

## 2024-07-02 RX ORDER — NYSTATIN 100000 U/G
CREAM TOPICAL 2 TIMES DAILY
Qty: 60 G | Refills: 1 | Status: SHIPPED | OUTPATIENT
Start: 2024-07-02 | End: 2024-07-16

## 2024-07-02 ASSESSMENT — ENCOUNTER SYMPTOMS
CONSTITUTIONAL NEGATIVE: 1
RESPIRATORY NEGATIVE: 1
GASTROINTESTINAL NEGATIVE: 1
CARDIOVASCULAR NEGATIVE: 1
CONFUSION: 1
WEAKNESS: 1

## 2024-07-02 NOTE — PROGRESS NOTES
Subjective   Patient ID: Roxanne Dodd is a 89 y.o. female who presents for Follow-up (HTN, wound).    Patient seen today in her private room at [facility]. She is awake and alert with appropriate awareness, pleasantly confused at times, no longer ambulatory, unable to bear weight and remains in transport chair. PMH: Post Covid-19, HTN, Gerd, Osteoarthritis, osteoporosis, OAB.    Today I was asked to see Jazmyne due to a reported worsening wound to her thigh/abdominal folds.  This has been an ongoing issue, opted to refuse recommended suggestions for a new wheelchair with a cushioned seat.  Remains in her wheelchair the majority of her day.  She can propel herself short distances, however is dependent on staff for distance.  She does manage to transfer on her own at times.  Staff reports that she was found on the ground one week ago when she slid off of her bed attempting to transfer.  No injury sustained.  Reported abdominal wound actually appears to be more of a fungal type rash under abdominal folds right and left sides.  Contributing factor is the elastic band of pull up.  Staff reports that appetite has been great.  Bowels reported as good.  She does have some urinary incontinence that can be contributing to a worsening wound.  Remains in her wheelchair throughout the day propelling self around.  She is able to stand and transfer with assistance.  Staff reports that last week she was found on the floor attempting to transfer on her own.  Denies chest pain, palpitations, tachycardia, and shortness of breath, wheezing, no PND/orthopnea, or respiratory complaints for the patient. There is no fever, or chills. No nausea, vomiting, diarrhea, headaches, or vision changes or recent falls. There is no hematuria, dysuria, flank pain, or increased urgency.           Current Outpatient Medications:     acetaminophen (Tylenol) 500 mg tablet, Take 1 tablet (500 mg) by mouth., Disp: , Rfl:     apixaban (Eliquis)  5 mg tablet, Take 1 tablet (5 mg) by mouth 2 times a day., Disp: , Rfl:     busPIRone (Buspar) 5 mg tablet, Take 1 tablet (5 mg) by mouth 2 times a day., Disp: , Rfl:     diclofenac sodium 1 % kit, every 6 hours. as directed Externally Four times a day, Disp: , Rfl:     docusate sodium (Colace) 100 mg capsule, Take 1 capsule (100 mg) by mouth once daily as needed for constipation., Disp: , Rfl:     glimepiride (Amaryl) 4 mg tablet, Take 1 tablet (4 mg) by mouth once daily., Disp: , Rfl:     guaiFENesin (Mucinex) 600 mg 12 hr tablet, Take 2 tablets (1,200 mg) by mouth 2 times a day. Do not crush, chew, or split., Disp: 120 tablet, Rfl: 11    lidocaine (lidocaine HCL) 4 % cream, Apply 0.1 g topically 3 times a day., Disp: , Rfl:     losartan (Cozaar) 50 mg tablet, Take 1 tablet (50 mg) by mouth once daily., Disp: , Rfl:     magnesium hydroxide (Milk of Magnesia) 400 mg/5 mL suspension, Take 5 mL by mouth every 6 hours. At least 4 hours between doses as needed Four times a day, Disp: , Rfl:     melatonin 3 mg tablet, Take 1 tablet (3 mg) by mouth as needed at bedtime for sleep., Disp: , Rfl:     menthol-zinc oxide (Calmoseptine) 0.44-20.6 % ointment, Apply 1 Application topically if needed for irritation., Disp: 60 g, Rfl: 1    metoprolol tartrate (Lopressor) 25 mg tablet, Take 1 tablet (25 mg) by mouth 2 times a day., Disp: , Rfl:     nystatin (Mycostatin) cream, Apply topically 2 times a day for 14 days. apply to affected area, Disp: 60 g, Rfl: 1    potassium chloride CR 20 mEq ER tablet, Take 1 tablet (20 mEq) by mouth once daily., Disp: , Rfl:     QUEtiapine (SEROquel) 25 mg tablet, Take 1 tablet (25 mg) by mouth once daily at bedtime., Disp: , Rfl:     spironolactone (Aldactone) 25 mg tablet, Take 1 tablet (25 mg) by mouth once daily., Disp: , Rfl:     torsemide (Demadex) 20 mg tablet, as directed Orally, Disp: , Rfl:     triamcinolone (Kenalog) 0.025 % cream, Apply 1 Application topically 2 times a day., Disp: ,  Rfl:      Review of Systems   Constitutional: Negative.    HENT: Negative.     Respiratory: Negative.     Cardiovascular: Negative.    Gastrointestinal: Negative.    Genitourinary: Negative.    Musculoskeletal:  Positive for gait problem.   Neurological:  Positive for weakness.   Psychiatric/Behavioral:  Positive for confusion.        Objective   /64 (BP Location: Left arm, Patient Position: Sitting)   Pulse 68   Temp 36 °C (96.8 °F)   Resp 16   SpO2 96%     Physical Exam  General: She is not in acute distress.     Appearance: Normal appearance. She is not ill-appearing.   HENT:      Head: Normocephalic and atraumatic.   Eyes:      Extraocular Movements: Extraocular movements intact.      Pupils: Pupils are equal, round, and reactive to light.   Cardiovascular:      Rate and Rhythm: Normal rate and regular rhythm.      Pulses: Normal pulses.      Heart sounds: Normal heart sounds.   Pulmonary:      Effort: Pulmonary effort is normal.      Breath sounds: Normal breath sounds.   Abdominal:      General: Bowel sounds are normal.      Palpations: Abdomen is soft.   Musculoskeletal:      Cervical back: Neck supple. No tenderness.   Skin:     Comments: Petechia bilateral arms and legs.    Neurological:      Mental Status: She is alert.   Psychiatric:         Mood and Affect: Mood normal.     Assessment/Plan   Diagnoses and all orders for this visit:  Fungal rash of torso  Comments:  Start nystatin cream under abdomen bid x10 days or until improves  Orders:  -     nystatin (Mycostatin) cream; Apply topically 2 times a day for 14 days. apply to affected area  Vascular dementia with behavior disturbance (Multi)  Comments:  Continue with supportive care  Arthritis of both knees  Comments:  Worsening - continue with supportive care and pain control with aspercream and tylenol    More than 50% of time spent in face-to-face discussion @ a total of 30 minutes with this patient on counseling, coordination of care,  collaboration with family and staff and review of medical records and diagnostics.    Fungal rash abdominal folds - start nystatin cream bid.       Sarai Martin, APRN-CNP

## 2024-07-08 VITALS
TEMPERATURE: 96.8 F | SYSTOLIC BLOOD PRESSURE: 128 MMHG | OXYGEN SATURATION: 96 % | HEART RATE: 68 BPM | RESPIRATION RATE: 16 BRPM | DIASTOLIC BLOOD PRESSURE: 64 MMHG

## 2024-07-08 PROBLEM — B36.9 FUNGAL RASH OF TORSO: Status: ACTIVE | Noted: 2024-07-08

## 2024-08-06 ENCOUNTER — OFFICE VISIT (OUTPATIENT)
Dept: PRIMARY CARE | Facility: CLINIC | Age: 89
End: 2024-08-06
Payer: MEDICARE

## 2024-08-06 VITALS
RESPIRATION RATE: 16 BRPM | OXYGEN SATURATION: 95 % | TEMPERATURE: 97.5 F | DIASTOLIC BLOOD PRESSURE: 60 MMHG | HEART RATE: 68 BPM | SYSTOLIC BLOOD PRESSURE: 127 MMHG

## 2024-08-06 DIAGNOSIS — F01.50 VASCULAR DEMENTIA WITHOUT BEHAVIORAL DISTURBANCE (MULTI): Chronic | ICD-10-CM

## 2024-08-06 DIAGNOSIS — M17.0 ARTHRITIS OF BOTH KNEES: Chronic | ICD-10-CM

## 2024-08-06 DIAGNOSIS — I48.0 PAROXYSMAL ATRIAL FIBRILLATION (MULTI): Chronic | ICD-10-CM

## 2024-08-06 DIAGNOSIS — I50.43 ACUTE ON CHRONIC COMBINED SYSTOLIC AND DIASTOLIC HEART FAILURE (MULTI): Primary | ICD-10-CM

## 2024-08-06 DIAGNOSIS — E11.69 TYPE 2 DIABETES MELLITUS WITH OTHER SPECIFIED COMPLICATION, WITHOUT LONG-TERM CURRENT USE OF INSULIN (MULTI): ICD-10-CM

## 2024-08-06 DIAGNOSIS — I15.9 SECONDARY HYPERTENSION: Chronic | ICD-10-CM

## 2024-08-06 PROCEDURE — 3074F SYST BP LT 130 MM HG: CPT | Performed by: NURSE PRACTITIONER

## 2024-08-06 PROCEDURE — 3078F DIAST BP <80 MM HG: CPT | Performed by: NURSE PRACTITIONER

## 2024-08-06 PROCEDURE — 1160F RVW MEDS BY RX/DR IN RCRD: CPT | Performed by: NURSE PRACTITIONER

## 2024-08-06 PROCEDURE — 1157F ADVNC CARE PLAN IN RCRD: CPT | Performed by: NURSE PRACTITIONER

## 2024-08-06 PROCEDURE — 1159F MED LIST DOCD IN RCRD: CPT | Performed by: NURSE PRACTITIONER

## 2024-08-06 PROCEDURE — 99348 HOME/RES VST EST LOW MDM 30: CPT | Performed by: NURSE PRACTITIONER

## 2024-08-06 NOTE — PROGRESS NOTES
Subjective   Patient ID: Roxanne Dodd is a 89 y.o. female who presents for Follow-up (HTN, Afib).    Patient seen today in her private room at [facility]. She is awake and alert with appropriate awareness, pleasantly confused at times, no longer ambulatory, unable to bear weight and remains in transport chair. PMH: Post Covid-19, HTN, Gerd, Osteoarthritis, osteoporosis, OAB.    I was asked to see this patient as her blood pressures have been on the lower side more consistently and have been holding the evening dose.  She does not have any complaints of dizziness, headaches, or increased weakness.  Staff continues to monitor blood pressures bid prior to administering medications.  Appetite has remained good, bowels appear to be good.  She is incontinent of bladder only at times.  Wound to posterior thigh is open, however appears to be healing slowly.  Denies chest pain, palpitations, tachycardia, and shortness of breath, wheezing, no PND/orthopnea, or respiratory complaints for the patient. There is no fever, or chills. No nausea, vomiting, diarrhea, headaches, or vision changes or recent falls. There is no hematuria, dysuria, flank pain, or increased urgency.           Current Outpatient Medications:     acetaminophen (Tylenol) 500 mg tablet, Take 1 tablet (500 mg) by mouth., Disp: , Rfl:     apixaban (Eliquis) 5 mg tablet, Take 1 tablet (5 mg) by mouth 2 times a day., Disp: , Rfl:     busPIRone (Buspar) 5 mg tablet, Take 1 tablet (5 mg) by mouth 2 times a day., Disp: , Rfl:     diclofenac sodium 1 % kit, every 6 hours. as directed Externally Four times a day, Disp: , Rfl:     docusate sodium (Colace) 100 mg capsule, Take 1 capsule (100 mg) by mouth once daily as needed for constipation., Disp: , Rfl:     glimepiride (Amaryl) 4 mg tablet, Take 1 tablet (4 mg) by mouth once daily., Disp: , Rfl:     guaiFENesin (Mucinex) 600 mg 12 hr tablet, Take 2 tablets (1,200 mg) by mouth 2 times a day. Do not crush,  chew, or split., Disp: 120 tablet, Rfl: 11    lidocaine (lidocaine HCL) 4 % cream, Apply 0.1 g topically 3 times a day., Disp: , Rfl:     losartan (Cozaar) 50 mg tablet, Take 1 tablet (50 mg) by mouth once daily., Disp: , Rfl:     magnesium hydroxide (Milk of Magnesia) 400 mg/5 mL suspension, Take 5 mL by mouth every 6 hours. At least 4 hours between doses as needed Four times a day, Disp: , Rfl:     melatonin 3 mg tablet, Take 1 tablet (3 mg) by mouth as needed at bedtime for sleep., Disp: , Rfl:     menthol-zinc oxide (Calmoseptine) 0.44-20.6 % ointment, Apply 1 Application topically if needed for irritation., Disp: 60 g, Rfl: 1    metoprolol tartrate (Lopressor) 25 mg tablet, Take 1 tablet (25 mg) by mouth 2 times a day., Disp: , Rfl:     potassium chloride CR 20 mEq ER tablet, Take 1 tablet (20 mEq) by mouth once daily., Disp: , Rfl:     QUEtiapine (SEROquel) 25 mg tablet, Take 1 tablet (25 mg) by mouth once daily at bedtime., Disp: , Rfl:     spironolactone (Aldactone) 25 mg tablet, Take 1 tablet (25 mg) by mouth once daily., Disp: , Rfl:     torsemide (Demadex) 20 mg tablet, as directed Orally, Disp: , Rfl:     triamcinolone (Kenalog) 0.025 % cream, Apply 1 Application topically 2 times a day., Disp: , Rfl:      Review of Systems  Constitutional: Negative.    HENT: Negative.     Respiratory: Negative.     Cardiovascular: Negative.    Gastrointestinal: Negative.    Genitourinary: Negative.    Musculoskeletal:  Positive for gait problem.   Neurological:  Positive for weakness.   Psychiatric/Behavioral:  Positive for confusion.      Objective   /60   Pulse 68   Temp 36.4 °C (97.5 °F)   Resp 16   SpO2 95%     Physical Exam  General: She is not in acute distress.     Appearance: Normal appearance. She is not ill-appearing.   HENT:      Head: Normocephalic and atraumatic.   Eyes:      Extraocular Movements: Extraocular movements intact.      Pupils: Pupils are equal, round, and reactive to light.    Cardiovascular:      Rate and Rhythm: Normal rate and regular rhythm.      Pulses: Normal pulses.      Heart sounds: Normal heart sounds.   Pulmonary:      Effort: Pulmonary effort is normal.      Breath sounds: Normal breath sounds.   Abdominal:      General: Bowel sounds are normal.      Palpations: Abdomen is soft.   Musculoskeletal:      Cervical back: Neck supple. No tenderness.   Skin:     Comments: Petechia bilateral arms and legs.    Neurological:      Mental Status: She is alert.   Psychiatric:         Mood and Affect: Mood normal.     Assessment/Plan   Diagnoses and all orders for this visit:  Acute on chronic combined systolic and diastolic heart failure (Multi)  Comments:  Managed - continue torsemide 20mg daily and spironolactone 25mg daily  Secondary hypertension  Comments:  Reduce metoprolol to 12.5mg bid  Paroxysmal atrial fibrillation (Multi)  Comments:  Managed - continue eliquis 5mg bid  Type 2 diabetes mellitus with other specified complication, without long-term current use of insulin (Multi)  Comments:  managed - continue glimepiride 4mg daily - may consider jardiance 10mg daily if covered by insurance  Arthritis of both knees  Comments:  Continue supportive care - non ambulatory - remains in wheelchair propelling self around  Vascular dementia without behavioral disturbance (Multi)  Comments:  Managed - continue quetiapine 25mg Qhs and buspirone 5mg bid PRN    Hypotension/Afib - will reduce metoprolol to 12.5mg bid and continue to monitor.             FRANCIS Lubin-CNP

## 2025-01-21 ENCOUNTER — OFFICE VISIT (OUTPATIENT)
Dept: PRIMARY CARE | Facility: CLINIC | Age: 89
End: 2025-01-21
Payer: MEDICARE

## 2025-01-21 DIAGNOSIS — M17.0 ARTHRITIS OF BOTH KNEES: Chronic | ICD-10-CM

## 2025-01-21 DIAGNOSIS — E11.69 TYPE 2 DIABETES MELLITUS WITH OTHER SPECIFIED COMPLICATION, WITHOUT LONG-TERM CURRENT USE OF INSULIN: Chronic | ICD-10-CM

## 2025-01-21 DIAGNOSIS — S31.000D SACRAL WOUND, SUBSEQUENT ENCOUNTER: ICD-10-CM

## 2025-01-21 DIAGNOSIS — R54 FRAIL ELDERLY: ICD-10-CM

## 2025-01-21 DIAGNOSIS — I50.43 ACUTE ON CHRONIC COMBINED SYSTOLIC AND DIASTOLIC HEART FAILURE: Primary | Chronic | ICD-10-CM

## 2025-01-21 DIAGNOSIS — I48.0 PAROXYSMAL ATRIAL FIBRILLATION (MULTI): Chronic | ICD-10-CM

## 2025-01-21 DIAGNOSIS — F01.50 VASCULAR DEMENTIA WITHOUT BEHAVIORAL DISTURBANCE (MULTI): Chronic | ICD-10-CM

## 2025-01-21 PROCEDURE — 99348 HOME/RES VST EST LOW MDM 30: CPT | Performed by: NURSE PRACTITIONER

## 2025-01-21 PROCEDURE — 1160F RVW MEDS BY RX/DR IN RCRD: CPT | Performed by: NURSE PRACTITIONER

## 2025-01-21 PROCEDURE — 3074F SYST BP LT 130 MM HG: CPT | Performed by: NURSE PRACTITIONER

## 2025-01-21 PROCEDURE — 1159F MED LIST DOCD IN RCRD: CPT | Performed by: NURSE PRACTITIONER

## 2025-01-21 PROCEDURE — 1157F ADVNC CARE PLAN IN RCRD: CPT | Performed by: NURSE PRACTITIONER

## 2025-01-21 PROCEDURE — 3078F DIAST BP <80 MM HG: CPT | Performed by: NURSE PRACTITIONER

## 2025-01-28 VITALS
RESPIRATION RATE: 16 BRPM | BODY MASS INDEX: 26.67 KG/M2 | WEIGHT: 176 LBS | DIASTOLIC BLOOD PRESSURE: 68 MMHG | OXYGEN SATURATION: 95 % | HEIGHT: 68 IN | HEART RATE: 78 BPM | SYSTOLIC BLOOD PRESSURE: 128 MMHG | TEMPERATURE: 97.8 F

## 2025-01-28 NOTE — PROGRESS NOTES
Subjective   Patient ID: Roxanne Dodd is a 89 y.o. female who presents for Follow-up (Facility Annual H&P).    Patient seen today in her private room at [facility]. She is awake and alert with appropriate awareness, pleasantly confused at times, no longer ambulatory, unable to bear weight and remains in transport chair. PMH: Post Covid-19, HTN, Gerd, Osteoarthritis, osteoporosis, OAB.  She is mainly homebound due to age related functional decline.    Patient is well known to this provider.  She is actually doing fairly well.  Staff reports that her appetite is good, weights are stable at 176 lbs. Reports staying hydrated.  No longer ambulating and propels herself around via wheelchair.  Active within facility and does participate in activities and all meals.  Endorsing that she is sleeping well at night.  No recent falls reported by staff.  She does endorse a chronic small wound at the top of her upper thigh at the base of her left lower gluteus james.  Wound is a small round open area where she reports depends tend to rub and irritate.  She does have some urinary incontinence only.  Struggles with episodes of constipation relieved with current regimen of stool softeners.  Dependent on all ADL's, requiring assistance to transfer from chair to bed.  Daughter is active with her care.  Denies chest pain, palpitations, tachycardia, and shortness of breath, wheezing, no PND/orthopnea, or respiratory complaints for the patient. There is no fever, or chills. No nausea, vomiting, diarrhea, headaches, or vision changes or recent falls. There is no hematuria, dysuria, flank pain, or increased urgency.  Home Visit: Medically necessary due to: the office visit would require excessive physical effort or pain for the patient, Illness or condition that results in activity limitation or restriction that impacts the ability to leave home such as: unsteady gait/poor balance and limited resources for transportation and  support           Current Outpatient Medications:     acetaminophen (Tylenol) 500 mg tablet, Take 1 tablet (500 mg) by mouth., Disp: , Rfl:     apixaban (Eliquis) 5 mg tablet, Take 1 tablet (5 mg) by mouth 2 times a day., Disp: , Rfl:     busPIRone (Buspar) 5 mg tablet, Take 1 tablet (5 mg) by mouth 2 times a day., Disp: , Rfl:     diclofenac sodium 1 % kit, every 6 hours. as directed Externally Four times a day, Disp: , Rfl:     docusate sodium (Colace) 100 mg capsule, Take 1 capsule (100 mg) by mouth once daily as needed for constipation., Disp: , Rfl:     glimepiride (Amaryl) 4 mg tablet, Take 1 tablet (4 mg) by mouth once daily., Disp: , Rfl:     guaiFENesin (Mucinex) 600 mg 12 hr tablet, Take 2 tablets (1,200 mg) by mouth 2 times a day. Do not crush, chew, or split., Disp: 120 tablet, Rfl: 11    lidocaine (lidocaine HCL) 4 % cream, Apply 0.1 g topically 3 times a day., Disp: , Rfl:     losartan (Cozaar) 50 mg tablet, Take 1 tablet (50 mg) by mouth once daily., Disp: , Rfl:     magnesium hydroxide (Milk of Magnesia) 400 mg/5 mL suspension, Take 5 mL by mouth every 6 hours. At least 4 hours between doses as needed Four times a day, Disp: , Rfl:     melatonin 3 mg tablet, Take 1 tablet (3 mg) by mouth as needed at bedtime for sleep., Disp: , Rfl:     menthol-zinc oxide (Calmoseptine) 0.44-20.6 % ointment, Apply 1 Application topically if needed for irritation., Disp: 60 g, Rfl: 1    metoprolol tartrate (Lopressor) 25 mg tablet, Take 1 tablet (25 mg) by mouth 2 times a day., Disp: , Rfl:     potassium chloride CR 20 mEq ER tablet, Take 1 tablet (20 mEq) by mouth once daily., Disp: , Rfl:     QUEtiapine (SEROquel) 25 mg tablet, Take 1 tablet (25 mg) by mouth once daily at bedtime., Disp: , Rfl:     spironolactone (Aldactone) 25 mg tablet, Take 1 tablet (25 mg) by mouth once daily., Disp: , Rfl:     torsemide (Demadex) 20 mg tablet, as directed Orally, Disp: , Rfl:     triamcinolone (Kenalog) 0.025 % cream, Apply 1  "Application topically 2 times a day., Disp: , Rfl:      Review of Systems  Constitutional: Negative.    HENT: Negative.     Respiratory: Negative.     Cardiovascular: Negative.    Gastrointestinal: Negative.    Genitourinary: Negative.    Musculoskeletal:  Positive for gait problem.   Neurological:  Positive for weakness.   Psychiatric/Behavioral:  Positive for confusion    Objective   /68 (BP Location: Left arm, Patient Position: Sitting, BP Cuff Size: Adult)   Pulse 78   Temp 36.6 °C (97.8 °F) (Temporal)   Resp 16   Ht 1.727 m (5' 8\")   Wt 79.8 kg (176 lb)   SpO2 95%   BMI 26.76 kg/m²     Physical Exam  General: She is not in acute distress.     Appearance: Normal appearance. She is not ill-appearing.   HENT:      Head: Normocephalic and atraumatic.   Eyes:      Extraocular Movements: Extraocular movements intact.      Pupils: Pupils are equal, round, and reactive to light.   Cardiovascular:      Rate and Rhythm: Normal rate and regular rhythm.      Pulses: Normal pulses.      Heart sounds: Normal heart sounds.   Pulmonary:      Effort: Pulmonary effort is normal.      Breath sounds: Normal breath sounds.   Abdominal:      General: Bowel sounds are normal.      Palpations: Abdomen is soft.   Musculoskeletal:      Cervical back: Neck supple. No tenderness.   Skin:     Comments: Petechia bilateral arms and legs.    Neurological:      Mental Status: She is alert.   Psychiatric:         Mood and Affect: Mood normal.      Assessment/Plan   Diagnoses and all orders for this visit:  Acute on chronic combined systolic and diastolic heart failure  Comments:  Delicately stable - cont torsemide 20mg Qd, metoprolol 25mg Qd  Paroxysmal atrial fibrillation (Multi)  Comments:  Managed - continue eliquis 5mg daily  Type 2 diabetes mellitus with other specified complication, without long-term current use of insulin  Comments:  Delicately stable - cont glimepiride 4mg daily  Arthritis of both knees  Comments:  Continue " with supportive care - no longer ambulatory  Vascular dementia without behavioral disturbance (Multi)  Comments:  Stable with normal progression  Frail elderly  Sacral wound, subsequent encounter  Comments:  Continue to pad and protect      More than 50% of time spent in face-to-face discussion @ a total of 30 minutes with this patient on counseling, coordination of care, collaboration with family and staff and review of medical records and diagnostics.     Routine labs ordered; cbc, bmp, a1c    Sarai Martin, APRN-CNP

## 2025-03-18 ENCOUNTER — OFFICE VISIT (OUTPATIENT)
Dept: PRIMARY CARE | Facility: CLINIC | Age: 89
End: 2025-03-18
Payer: MEDICARE

## 2025-03-18 DIAGNOSIS — I50.42 CHRONIC COMBINED SYSTOLIC AND DIASTOLIC CONGESTIVE HEART FAILURE: Primary | ICD-10-CM

## 2025-03-18 DIAGNOSIS — E11.69 TYPE 2 DIABETES MELLITUS WITH OTHER SPECIFIED COMPLICATION, WITHOUT LONG-TERM CURRENT USE OF INSULIN: Chronic | ICD-10-CM

## 2025-03-18 DIAGNOSIS — K59.01 SLOW TRANSIT CONSTIPATION: Chronic | ICD-10-CM

## 2025-03-18 DIAGNOSIS — F01.50 VASCULAR DEMENTIA WITHOUT BEHAVIORAL DISTURBANCE (MULTI): Chronic | ICD-10-CM

## 2025-03-18 DIAGNOSIS — M17.0 ARTHRITIS OF BOTH KNEES: Chronic | ICD-10-CM

## 2025-03-18 DIAGNOSIS — I48.0 PAROXYSMAL ATRIAL FIBRILLATION (MULTI): Chronic | ICD-10-CM

## 2025-03-18 DIAGNOSIS — I15.9 SECONDARY HYPERTENSION: Chronic | ICD-10-CM

## 2025-03-18 PROCEDURE — 1160F RVW MEDS BY RX/DR IN RCRD: CPT | Performed by: NURSE PRACTITIONER

## 2025-03-18 PROCEDURE — 3074F SYST BP LT 130 MM HG: CPT | Performed by: NURSE PRACTITIONER

## 2025-03-18 PROCEDURE — 1157F ADVNC CARE PLAN IN RCRD: CPT | Performed by: NURSE PRACTITIONER

## 2025-03-18 PROCEDURE — 3078F DIAST BP <80 MM HG: CPT | Performed by: NURSE PRACTITIONER

## 2025-03-18 PROCEDURE — 1159F MED LIST DOCD IN RCRD: CPT | Performed by: NURSE PRACTITIONER

## 2025-03-18 PROCEDURE — 99348 HOME/RES VST EST LOW MDM 30: CPT | Performed by: NURSE PRACTITIONER

## 2025-03-24 VITALS
SYSTOLIC BLOOD PRESSURE: 126 MMHG | DIASTOLIC BLOOD PRESSURE: 70 MMHG | TEMPERATURE: 96.8 F | OXYGEN SATURATION: 95 % | HEART RATE: 68 BPM | RESPIRATION RATE: 16 BRPM

## 2025-03-24 NOTE — PROGRESS NOTES
"Subjective   Patient ID: Roxanne Dodd is a 89 y.o. female who presents for Follow-up (in assisted living/ home patient being seen at [facility] and evaluated for follow up / recent fall.).    Patient seen today in her private room at [facility]. She is awake and alert with appropriate awareness, pleasantly confused at times, no longer ambulatory, unable to bear weight and remains in transport chair. PMH: Post Covid-19, HTN, Gerd, Osteoarthritis, osteoporosis, OAB.  She is mainly homebound due to age related functional decline.     Patient is well known to this provider.  I was asked to see this patient due to increased weakness.  Staff reports that her appetite is good, weights are stable at 176 lbs. Reports staying hydrated.  No longer ambulating and propels herself around via wheelchair.  Active within facility and does participate in activities and all meals and most activities.  Endorsing that she is sleeping well at night.  No recent falls reported by staff.  She does endorse a chronic small wound at the top of her upper thigh at the base of her left lower gluteus james.  Wound is a small round open area where she reports depends tend to rub and irritate.  Currently treated with barrier cream and small foam bandage.  She does have some urinary incontinence only.  Struggles with episodes of constipation relieved with current regimen of stool softeners.  Dependent on all ADL's, requiring assistance to transfer from chair to bed.  Daughter is active with her care.  Staff reports that over the past three weeks she has refused taking showers.  This has actually been an ongoing incident, and patient states that it is too cold and \"I just didn't feel up to it\".  Reports that she does sponge bath and does not appear odorous.  However we did discuss incontinence and the importance of good pericare to avoid urinary tract infections.  She is in agreement to take a shower next time.  " Denies chest pain, palpitations, tachycardia, and shortness of breath, wheezing, no PND/orthopnea, or respiratory complaints for the patient. There is no fever, or chills. No nausea, vomiting, diarrhea, headaches, or vision changes or recent falls. There is no hematuria, dysuria, flank pain, or increased urgency.  Home Visit: Medically necessary due to: the office visit would require excessive physical effort or pain for the patient, Illness or condition that results in activity limitation or restriction that impacts the ability to leave home such as: unsteady gait/poor balance and limited resources for transportation and support                Current Outpatient Medications:     acetaminophen (Tylenol) 500 mg tablet, Take 1 tablet (500 mg) by mouth., Disp: , Rfl:     apixaban (Eliquis) 5 mg tablet, Take 1 tablet (5 mg) by mouth 2 times a day., Disp: , Rfl:     busPIRone (Buspar) 5 mg tablet, Take 1 tablet (5 mg) by mouth 2 times a day., Disp: , Rfl:     diclofenac sodium 1 % kit, every 6 hours. as directed Externally Four times a day, Disp: , Rfl:     docusate sodium (Colace) 100 mg capsule, Take 1 capsule (100 mg) by mouth once daily as needed for constipation., Disp: , Rfl:     glimepiride (Amaryl) 4 mg tablet, Take 1 tablet (4 mg) by mouth once daily., Disp: , Rfl:     lidocaine (lidocaine HCL) 4 % cream, Apply 0.1 g topically 3 times a day., Disp: , Rfl:     losartan (Cozaar) 50 mg tablet, Take 1 tablet (50 mg) by mouth once daily., Disp: , Rfl:     magnesium hydroxide (Milk of Magnesia) 400 mg/5 mL suspension, Take 5 mL by mouth every 6 hours. At least 4 hours between doses as needed Four times a day, Disp: , Rfl:     melatonin 3 mg tablet, Take 1 tablet (3 mg) by mouth as needed at bedtime for sleep., Disp: , Rfl:     menthol-zinc oxide (Calmoseptine) 0.44-20.6 % ointment, Apply 1 Application topically if needed for irritation., Disp: 60 g, Rfl: 1    metoprolol tartrate (Lopressor) 25 mg tablet, Take 1 tablet  (25 mg) by mouth 2 times a day., Disp: , Rfl:     potassium chloride CR 20 mEq ER tablet, Take 1 tablet (20 mEq) by mouth once daily., Disp: , Rfl:     QUEtiapine (SEROquel) 25 mg tablet, Take 1 tablet (25 mg) by mouth once daily at bedtime., Disp: , Rfl:     spironolactone (Aldactone) 25 mg tablet, Take 1 tablet (25 mg) by mouth once daily., Disp: , Rfl:     torsemide (Demadex) 20 mg tablet, as directed Orally, Disp: , Rfl:     triamcinolone (Kenalog) 0.025 % cream, Apply 1 Application topically 2 times a day., Disp: , Rfl:      Review of Systems  Constitutional: Negative.    HENT: Negative.     Respiratory: Negative.     Cardiovascular: Negative.    Gastrointestinal: Negative.    Genitourinary: Negative.    Musculoskeletal:  Positive for gait problem.   Neurological:  Positive for weakness.   Psychiatric/Behavioral:  Positive for confusion    Objective   /70 (BP Cuff Size: Adult)   Pulse 68   Temp 36 °C (96.8 °F) (Temporal)   Resp 16   SpO2 95%     Physical Exam  General: She is not in acute distress.     Appearance: Normal appearance. She is not ill-appearing.   HENT:      Head: Normocephalic and atraumatic.   Eyes:      Extraocular Movements: Extraocular movements intact.      Pupils: Pupils are equal, round, and reactive to light.   Cardiovascular:      Rate and Rhythm: Normal rate and regular rhythm.      Pulses: Normal pulses.      Heart sounds: Normal heart sounds.   Pulmonary:      Effort: Pulmonary effort is normal.      Breath sounds: Normal breath sounds.   Abdominal:      General: Bowel sounds are normal.      Palpations: Abdomen is soft.   Musculoskeletal:      Cervical back: Neck supple. No tenderness.   Skin:     Comments: Petechia bilateral arms and legs.    Neurological:      Mental Status: She is alert.   Psychiatric:         Mood and Affect: Mood normal.     Assessment/Plan   Diagnoses and all orders for this visit:  Chronic combined systolic and diastolic congestive heart  failure  Comments:  Managed - cont [id number] daily  Secondary hypertension  Comments:  Managed - cont losartan 50mg daily  Paroxysmal atrial fibrillation (Multi)  Comments:  Managed - cont eliquis 5mg daily  Type 2 diabetes mellitus with other specified complication, without long-term current use of insulin  Comments:  Managed - cont glimepride 4mg daily  Slow transit constipation  Comments:  Cont Milk of mag PRN  and daily colace PRN  Arthritis of both knees  Comments:  Managed with supportive care - cont daily tylenol and topical lidocain cream  Vascular dementia without behavioral disturbance (Multi)  Comments:  Managed - cont with seroquel Qhs and 1x daily prn    More than 50% of time spent in face-to-face discussion @ a total of 60 minutes with this patient on counseling, coordination of care, collaboration with family and staff and review of medical records and diagnostics.               Sarai Martin, APRN-CNP

## 2025-04-15 ENCOUNTER — OFFICE VISIT (OUTPATIENT)
Dept: PRIMARY CARE | Facility: CLINIC | Age: 89
End: 2025-04-15
Payer: MEDICARE

## 2025-04-15 DIAGNOSIS — E11.9 TYPE 2 DIABETES MELLITUS WITHOUT COMPLICATION, WITHOUT LONG-TERM CURRENT USE OF INSULIN: ICD-10-CM

## 2025-04-15 DIAGNOSIS — M17.0 ARTHRITIS OF BOTH KNEES: Chronic | ICD-10-CM

## 2025-04-15 DIAGNOSIS — I50.43 ACUTE ON CHRONIC COMBINED SYSTOLIC AND DIASTOLIC HEART FAILURE: Primary | ICD-10-CM

## 2025-04-15 DIAGNOSIS — L40.9 PSORIASIS AND SIMILAR DISORDER: ICD-10-CM

## 2025-04-15 DIAGNOSIS — I15.9 SECONDARY HYPERTENSION: Chronic | ICD-10-CM

## 2025-04-15 PROCEDURE — 3078F DIAST BP <80 MM HG: CPT | Performed by: NURSE PRACTITIONER

## 2025-04-15 PROCEDURE — 99349 HOME/RES VST EST MOD MDM 40: CPT | Performed by: NURSE PRACTITIONER

## 2025-04-15 PROCEDURE — 1159F MED LIST DOCD IN RCRD: CPT | Performed by: NURSE PRACTITIONER

## 2025-04-15 PROCEDURE — 1157F ADVNC CARE PLAN IN RCRD: CPT | Performed by: NURSE PRACTITIONER

## 2025-04-15 PROCEDURE — 3074F SYST BP LT 130 MM HG: CPT | Performed by: NURSE PRACTITIONER

## 2025-04-15 PROCEDURE — 1160F RVW MEDS BY RX/DR IN RCRD: CPT | Performed by: NURSE PRACTITIONER

## 2025-04-15 NOTE — PROGRESS NOTES
"Subjective   Patient ID: Roxanne Dodd is a 89 y.o. female who presents for Follow-up (in assisted living/ home patient being seen at [facility] and evaluated for rash and skin irritation).    Patient seen today in her private room at [facility]. She is awake and alert with appropriate awareness, pleasantly confused at times, no longer ambulatory, unable to bear weight and remains in transport chair. PMH: Post Covid-19, HTN, Afib, CHF, DMII, Gerd, Osteoarthritis, osteoporosis, OAB, age related short term memory loss.  She is mainly homebound due to age related functional decline.    I was asked to see this patient today for multiple chronic issues including skin dermatitis, increased bruising and functional decline.  Patient reports that she has noticed increased bruising to bilateral lower forearms.  Denies falls, or bumping into objects.  Noted a rather large bruising with a small bump on her right forearm.  Discussed that bruising is most likely from long term eliquis and due to age and thin skin that she does bruise quite easily.  Endorsing that she does not recall hitting her arm on anything.  Presents with dry patches to hands, elbows and knees, which actually appear to be more of a psoriasis not worsening and has slightly improved over the past several weeks with topical lotions and triaminicolone cream that she applies herself.  Staff does mention that she often refusing bathing, and she admits that it is because \"it is too cold, and gets chilled easily\".  Remains in her wheelchair throughout the day, legs dependent, staff transporting for meals, otherwise will propel herself around.  She is no longer able to bear weight and requires assistance for all ADL's.  Appetite reported as good, bowels noted as fairly regular.  She is incontinent of bladder only at times.  Denies chest pain, palpitations, tachycardia, and shortness of breath, wheezing, no PND/orthopnea, or respiratory " complaints for the patient. There is no fever, or chills. No nausea, vomiting, diarrhea, headaches, or vision changes or recent falls. There is no hematuria, dysuria, flank pain, or increased urgency.    Home Visit: Medically necessary due to: dementia/cognitive impairment, unsteady gait/poor balance, shortness of breath with minimal exertion, Illness or condition that results in activity limitation or restriction that impacts the ability to leave home such as: equipment and /or human assistance needed to safely leave the home, patient has limited support systems to help the patient attend office visits, the office visit would require excessive physical effort or pain for the patient.            Current Outpatient Medications:     acetaminophen (Tylenol) 500 mg tablet, Take 1 tablet (500 mg) by mouth., Disp: , Rfl:     apixaban (Eliquis) 5 mg tablet, Take 0.5 tablets (2.5 mg) by mouth 2 times a day., Disp: , Rfl:     busPIRone (Buspar) 5 mg tablet, Take 1 tablet (5 mg) by mouth 2 times a day as needed., Disp: , Rfl:     diclofenac sodium 1 % kit, every 6 hours. as directed Externally Four times a day, Disp: , Rfl:     docusate sodium (Colace) 100 mg capsule, Take 1 capsule (100 mg) by mouth once daily as needed for constipation., Disp: , Rfl:     glimepiride (Amaryl) 4 mg tablet, Take 1 tablet (4 mg) by mouth once daily., Disp: , Rfl:     losartan (Cozaar) 50 mg tablet, Take 1 tablet (50 mg) by mouth once daily., Disp: , Rfl:     magnesium hydroxide (Milk of Magnesia) 400 mg/5 mL suspension, Take 5 mL by mouth every 6 hours. At least 4 hours between doses as needed Four times a day, Disp: , Rfl:     melatonin 3 mg tablet, Take 1 tablet (3 mg) by mouth as needed at bedtime for sleep., Disp: , Rfl:     menthol-zinc oxide (Calmoseptine) 0.44-20.6 % ointment, Apply 1 Application topically if needed for irritation., Disp: 60 g, Rfl: 1    potassium chloride CR 20 mEq ER tablet, Take 1 tablet (20 mEq) by mouth once daily.,  "Disp: , Rfl:     QUEtiapine (SEROquel) 25 mg tablet, Take 1 tablet (25 mg) by mouth once daily at bedtime., Disp: , Rfl:     spironolactone (Aldactone) 25 mg tablet, Take 1 tablet (25 mg) by mouth once daily., Disp: , Rfl:     torsemide (Demadex) 20 mg tablet, as directed Orally, Disp: , Rfl:     triamcinolone (Kenalog) 0.025 % cream, Apply 1 Application topically 2 times a day., Disp: , Rfl:     metoprolol tartrate (Lopressor) 25 mg tablet, Take 0.5 tablets (12.5 mg) by mouth 2 times a day., Disp: , Rfl:      Review of Systems  Constitutional: Negative.    HENT: Negative.     Respiratory: Negative.     Cardiovascular: Negative.    Gastrointestinal: Negative.    Genitourinary: Negative.    Musculoskeletal:  Positive for gait problem.   Neurological:  Positive for weakness.   Psychiatric/Behavioral:  Positive for confusion    Objective   /78 (BP Location: Left arm, Patient Position: Sitting, BP Cuff Size: Adult)   Pulse 72   Temp 36 °C (96.8 °F) (Temporal)   Resp 16   Ht 1.702 m (5' 7\")   Wt 79.8 kg (176 lb)   SpO2 94%   BMI 27.57 kg/m²     Physical Exam  General: She is not in acute distress.     Appearance: Normal appearance. She is not ill-appearing.   HENT:      Head: Normocephalic and atraumatic.   Eyes:      Extraocular Movements: Extraocular movements intact.      Pupils: Pupils are equal, round, and reactive to light.   Cardiovascular:      Rate and Rhythm: Normal rate and regular rhythm.      Pulses: Normal pulses.      Heart sounds: Normal heart sounds.   Pulmonary:      Effort: Pulmonary effort is normal.      Breath sounds: Normal breath sounds.   Abdominal:      General: Bowel sounds are normal.      Palpations: Abdomen is soft.   Musculoskeletal:      Cervical back: Neck supple. No tenderness.   Skin:     Comments: Petechia bilateral arms and legs.    Neurological:      Mental Status: She is alert.   Psychiatric:         Mood and Affect: Mood normal.     Assessment/Plan   Diagnoses and all " orders for this visit:  Acute on chronic combined systolic and diastolic heart failure  Comments:  Managed - cont spironolactone 25mg daily, metoprolol 25mg daily, torsemide 20mg daily  Secondary hypertension  Comments:  Managed - cont losartan 50mg daily  Type 2 diabetes mellitus without complication, without long-term current use of insulin  Comments:  Managed cont glimepiride 4mg daily  Arthritis of both knees  Comments:  Managed - Resides in half-way, cont supportive care - assistance with all ADL's.  Psoriasis and similar disorder  Comments:  Managed - cont topical lotions, triamcinolone cream bid. eucerin daily to extremeties    More than 50% of time spent in face-to-face discussion @ a total of 40 minutes with this patient on counseling, coordination of care, and review of medical records and diagnostics. Advised pt to contact house calls office with any acute concerns or medication needs.              Sarai Martin, APRN-CNP

## 2025-04-22 VITALS
HEIGHT: 67 IN | HEART RATE: 72 BPM | BODY MASS INDEX: 27.62 KG/M2 | OXYGEN SATURATION: 94 % | DIASTOLIC BLOOD PRESSURE: 78 MMHG | TEMPERATURE: 96.8 F | WEIGHT: 176 LBS | RESPIRATION RATE: 16 BRPM | SYSTOLIC BLOOD PRESSURE: 116 MMHG

## 2025-04-22 PROBLEM — L40.9 PSORIASIS AND SIMILAR DISORDER: Status: ACTIVE | Noted: 2025-04-22

## 2025-04-29 ENCOUNTER — OFFICE VISIT (OUTPATIENT)
Dept: PRIMARY CARE | Facility: CLINIC | Age: 89
End: 2025-04-29
Payer: MEDICARE

## 2025-04-29 DIAGNOSIS — L40.9 PSORIASIS AND SIMILAR DISORDER: Chronic | ICD-10-CM

## 2025-04-29 DIAGNOSIS — F01.518 VASCULAR DEMENTIA WITH BEHAVIOR DISTURBANCE: ICD-10-CM

## 2025-04-29 DIAGNOSIS — E11.69 TYPE 2 DIABETES MELLITUS WITH OTHER SPECIFIED COMPLICATION, WITHOUT LONG-TERM CURRENT USE OF INSULIN: Chronic | ICD-10-CM

## 2025-04-29 DIAGNOSIS — I48.0 PAROXYSMAL ATRIAL FIBRILLATION (MULTI): Chronic | ICD-10-CM

## 2025-04-29 DIAGNOSIS — F41.8 SITUATIONAL ANXIETY: Chronic | ICD-10-CM

## 2025-04-29 DIAGNOSIS — I50.43 ACUTE ON CHRONIC COMBINED SYSTOLIC AND DIASTOLIC HEART FAILURE: Primary | ICD-10-CM

## 2025-04-29 DIAGNOSIS — I15.9 SECONDARY HYPERTENSION: Chronic | ICD-10-CM

## 2025-04-29 PROCEDURE — 3078F DIAST BP <80 MM HG: CPT | Performed by: NURSE PRACTITIONER

## 2025-04-29 PROCEDURE — 99348 HOME/RES VST EST LOW MDM 30: CPT | Performed by: NURSE PRACTITIONER

## 2025-04-29 PROCEDURE — 1159F MED LIST DOCD IN RCRD: CPT | Performed by: NURSE PRACTITIONER

## 2025-04-29 PROCEDURE — 1157F ADVNC CARE PLAN IN RCRD: CPT | Performed by: NURSE PRACTITIONER

## 2025-04-29 PROCEDURE — 3074F SYST BP LT 130 MM HG: CPT | Performed by: NURSE PRACTITIONER

## 2025-04-29 PROCEDURE — 1160F RVW MEDS BY RX/DR IN RCRD: CPT | Performed by: NURSE PRACTITIONER

## 2025-05-06 ENCOUNTER — TELEPHONE (OUTPATIENT)
Dept: PRIMARY CARE | Facility: CLINIC | Age: 89
End: 2025-05-06
Payer: MEDICARE

## 2025-05-06 VITALS
DIASTOLIC BLOOD PRESSURE: 68 MMHG | RESPIRATION RATE: 16 BRPM | HEART RATE: 78 BPM | TEMPERATURE: 97 F | OXYGEN SATURATION: 95 % | SYSTOLIC BLOOD PRESSURE: 126 MMHG

## 2025-05-06 DIAGNOSIS — E11.69 TYPE 2 DIABETES MELLITUS WITH OTHER SPECIFIED COMPLICATION, WITHOUT LONG-TERM CURRENT USE OF INSULIN: Primary | ICD-10-CM

## 2025-05-06 NOTE — PROGRESS NOTES
"Subjective   Patient ID: Roxanne Dodd is a 89 y.o. female who presents for Follow-up.    Patient seen today in her private room at [facility]. She is awake and alert with appropriate awareness, pleasantly confused at times, no longer ambulatory, unable to bear weight and remains in transport chair. PMH: Post Covid-19, HTN, Afib, CHF, DMII, Gerd, Osteoarthritis, osteoporosis, OAB, age related short term memory loss.  She is mainly homebound due to age related functional decline.     Patient requested visit today for multiple chronic issues including skin dermatitis, functional decline and questions regarding wheelchair.  She is asking about increased bruising on her arms, new bruising from lab draw two days ago.  Again we have discussed that bruising to bilateral arms is most likely from long term eliquis and due to age and thin skin that she does bruise quite easily.  Presents with dry patches to hands, elbows and knees, which actually appear to be more of a psoriasis not worsening and has slightly improved over the past several weeks with topical lotions and triaminicolone cream that she applies herself.  Patches have remained about the same, with some noted as healing.  Staff does reports that she has actually been cooperative with bathing and has not refused.  She is adamant about not using new wheelchair that was ordered for her and it remains sitting in the hallway.  In fact was upset that it was even ordered.  We discussed that her transport chair is old and is breaking down, and actually does not really fit in the chair, which can be contributing to some of her skin issues on her buttox. She verbailzed understanding, however still is not willing to use stating \"this chair is just fine\".   Staff using wheelchair to transport distances when needed.   Remains in her transport wheelchair throughout the day, legs dependent, staff transporting for meals, otherwise will propel herself around.  She is no " longer able to bear weight and requires assistance for all ADL's.  Appetite reported as good, bowels noted as fairly regular with current regimen.  She is incontinent of bladder only at times.  Denies chest pain, palpitations, tachycardia, and shortness of breath, wheezing, no PND/orthopnea, or respiratory complaints for the patient. There is no fever, or chills. No nausea, vomiting, diarrhea, headaches, or vision changes or recent falls. There is no hematuria, dysuria, flank pain, or increased urgency.     Home Visit: Medically necessary due to: dementia/cognitive impairment, unsteady gait/poor balance, shortness of breath with minimal exertion, Illness or condition that results in activity limitation or restriction that impacts the ability to leave home such as: equipment and /or human assistance needed to safely leave the home, patient has limited support systems to help the patient attend office visits, the office visit would require excessive physical effort or pain for the patient.            Current Medications[1]     acetaminophen (Tylenol) 500 mg tablet, Take 1 tablet (500 mg) by mouth., Disp: , Rfl:     apixaban (Eliquis) 5 mg tablet, Take 0.5 tablets (2.5 mg) by mouth 2 times a day., Disp: , Rfl:     busPIRone (Buspar) 5 mg tablet, Take 1 tablet (5 mg) by mouth 2 times a day as needed., Disp: , Rfl:     diclofenac sodium 1 % kit, every 6 hours. as directed Externally Four times a day, Disp: , Rfl:     docusate sodium (Colace) 100 mg capsule, Take 1 capsule (100 mg) by mouth once daily as needed for constipation., Disp: , Rfl:     glimepiride (Amaryl) 4 mg tablet, Take 1 tablet (4 mg) by mouth once daily., Disp: , Rfl:     losartan (Cozaar) 50 mg tablet, Take 1 tablet (50 mg) by mouth once daily., Disp: , Rfl:     magnesium hydroxide (Milk of Magnesia) 400 mg/5 mL suspension, Take 5 mL by mouth every 6 hours. At least 4 hours between doses as needed Four times a day, Disp: , Rfl:     melatonin 3 mg tablet,  Take 1 tablet (3 mg) by mouth as needed at bedtime for sleep., Disp: , Rfl:     menthol-zinc oxide (Calmoseptine) 0.44-20.6 % ointment, Apply 1 Application topically if needed for irritation., Disp: 60 g, Rfl: 1    metoprolol tartrate (Lopressor) 25 mg tablet, Take 0.5 tablets (12.5 mg) by mouth 2 times a day., Disp: , Rfl:     potassium chloride CR 20 mEq ER tablet, Take 1 tablet (20 mEq) by mouth once daily., Disp: , Rfl:     QUEtiapine (SEROquel) 25 mg tablet, Take 1 tablet (25 mg) by mouth once daily at bedtime., Disp: , Rfl:     spironolactone (Aldactone) 25 mg tablet, Take 1 tablet (25 mg) by mouth once daily., Disp: , Rfl:     torsemide (Demadex) 20 mg tablet, as directed Orally, Disp: , Rfl:     triamcinolone (Kenalog) 0.025 % cream, Apply 1 Application topically 2 times a day., Disp: , Rfl:     Review of Systems  Constitutional: Negative.    HENT: Negative.     Respiratory: Negative.     Cardiovascular: Negative.    Gastrointestinal: Negative.    Genitourinary: Negative.    Musculoskeletal:  Positive for gait problem.   Neurological:  Positive for weakness.   Psychiatric/Behavioral:  Positive for confusion    Objective   /68   Pulse 78   Temp 36.1 °C (97 °F) (Temporal)   Resp 16   SpO2 95%     Physical Exam  General: She is not in acute distress.     Appearance: Normal appearance. She is not ill-appearing.   HENT:      Head: Normocephalic and atraumatic.   Eyes:      Extraocular Movements: Extraocular movements intact.      Pupils: Pupils are equal, round, and reactive to light.   Cardiovascular:      Rate and Rhythm: Normal rate and regular rhythm.      Pulses: Normal pulses.      Heart sounds: Normal heart sounds.   Pulmonary:      Effort: Pulmonary effort is normal.      Breath sounds: Normal breath sounds.   Abdominal:      General: Bowel sounds are normal.      Palpations: Abdomen is soft.   Musculoskeletal:      Cervical back: Neck supple. No tenderness.   Skin:     Comments: Petechia  bilateral arms and legs.  Significant busing bilateral arms  Neurological:      Mental Status: She is alert.   Psychiatric:         Mood and Affect: Mood normal.     Last A1c - January 2025 7.7    Assessment/Plan   Diagnoses and all orders for this visit:  Acute on chronic combined systolic and diastolic heart failure  Comments:  Managed - cont torsemide 20 mg daily, spironolactone 25mg daily  Secondary hypertension  Comments:  Managed - cont metoprolol 12.5mg bid, losartan 50mg daily  Paroxysmal atrial fibrillation (Multi)  Comments:  Managed cont eliquis 2.5mg bid  Type 2 diabetes mellitus with other specified complication, without long-term current use of insulin  Comments:  Stable - cont glimepiride 4mg daily  January 2025 a1c 7.7  Situational anxiety  Comments:  Managed - may use seroquel 25mg 1 x daily  Vascular dementia with behavior disturbance  Psoriasis and similar disorder  Comments:  Managed - cont use of triamicolone cream to affected areas    More than 50% of time spent in face-to-face discussion @ a total of 40 minutes with this patient on counseling, coordination of care, collaboration with family and staff and review of medical records and diagnostics. Collaboration with family regarding wheelchair    Routine labs ordered to be drawn at facility       Sarai S Kitchen, APRN-CNP        [1]   Current Outpatient Medications:     acetaminophen (Tylenol) 500 mg tablet, Take 1 tablet (500 mg) by mouth., Disp: , Rfl:     apixaban (Eliquis) 5 mg tablet, Take 0.5 tablets (2.5 mg) by mouth 2 times a day., Disp: , Rfl:     busPIRone (Buspar) 5 mg tablet, Take 1 tablet (5 mg) by mouth 2 times a day as needed., Disp: , Rfl:     diclofenac sodium 1 % kit, every 6 hours. as directed Externally Four times a day, Disp: , Rfl:     docusate sodium (Colace) 100 mg capsule, Take 1 capsule (100 mg) by mouth once daily as needed for constipation., Disp: , Rfl:     glimepiride (Amaryl) 4 mg tablet, Take 1 tablet (4 mg) by  mouth once daily., Disp: , Rfl:     losartan (Cozaar) 50 mg tablet, Take 1 tablet (50 mg) by mouth once daily., Disp: , Rfl:     magnesium hydroxide (Milk of Magnesia) 400 mg/5 mL suspension, Take 5 mL by mouth every 6 hours. At least 4 hours between doses as needed Four times a day, Disp: , Rfl:     melatonin 3 mg tablet, Take 1 tablet (3 mg) by mouth as needed at bedtime for sleep., Disp: , Rfl:     menthol-zinc oxide (Calmoseptine) 0.44-20.6 % ointment, Apply 1 Application topically if needed for irritation., Disp: 60 g, Rfl: 1    metoprolol tartrate (Lopressor) 25 mg tablet, Take 0.5 tablets (12.5 mg) by mouth 2 times a day., Disp: , Rfl:     potassium chloride CR 20 mEq ER tablet, Take 1 tablet (20 mEq) by mouth once daily., Disp: , Rfl:     QUEtiapine (SEROquel) 25 mg tablet, Take 1 tablet (25 mg) by mouth once daily at bedtime., Disp: , Rfl:     spironolactone (Aldactone) 25 mg tablet, Take 1 tablet (25 mg) by mouth once daily., Disp: , Rfl:     torsemide (Demadex) 20 mg tablet, as directed Orally, Disp: , Rfl:     triamcinolone (Kenalog) 0.025 % cream, Apply 1 Application topically 2 times a day., Disp: , Rfl:

## 2025-08-19 ENCOUNTER — OFFICE VISIT (OUTPATIENT)
Dept: PRIMARY CARE | Facility: CLINIC | Age: 89
End: 2025-08-19
Payer: MEDICARE

## 2025-08-19 DIAGNOSIS — J39.8 CONGESTION OF UPPER RESPIRATORY TRACT: ICD-10-CM

## 2025-08-19 DIAGNOSIS — L40.9 PSORIASIS AND SIMILAR DISORDER: ICD-10-CM

## 2025-08-19 DIAGNOSIS — N18.31 STAGE 3A CHRONIC KIDNEY DISEASE (MULTI): ICD-10-CM

## 2025-08-19 DIAGNOSIS — I50.43 ACUTE ON CHRONIC COMBINED SYSTOLIC AND DIASTOLIC HEART FAILURE: Primary | Chronic | ICD-10-CM

## 2025-08-19 DIAGNOSIS — I48.0 PAROXYSMAL ATRIAL FIBRILLATION (MULTI): Chronic | ICD-10-CM

## 2025-08-19 DIAGNOSIS — M17.0 ARTHRITIS OF BOTH KNEES: Chronic | ICD-10-CM

## 2025-08-19 DIAGNOSIS — F01.518 VASCULAR DEMENTIA WITH BEHAVIOR DISTURBANCE: ICD-10-CM

## 2025-08-19 DIAGNOSIS — E11.69 TYPE 2 DIABETES MELLITUS WITH OTHER SPECIFIED COMPLICATION, WITHOUT LONG-TERM CURRENT USE OF INSULIN: Chronic | ICD-10-CM

## 2025-08-19 DIAGNOSIS — F41.8 SITUATIONAL ANXIETY: Chronic | ICD-10-CM

## 2025-08-19 PROCEDURE — 99349 HOME/RES VST EST MOD MDM 40: CPT | Performed by: NURSE PRACTITIONER

## 2025-08-19 PROCEDURE — 1160F RVW MEDS BY RX/DR IN RCRD: CPT | Performed by: NURSE PRACTITIONER

## 2025-08-19 PROCEDURE — 1159F MED LIST DOCD IN RCRD: CPT | Performed by: NURSE PRACTITIONER

## 2025-08-19 PROCEDURE — 3074F SYST BP LT 130 MM HG: CPT | Performed by: NURSE PRACTITIONER

## 2025-08-19 PROCEDURE — 3078F DIAST BP <80 MM HG: CPT | Performed by: NURSE PRACTITIONER

## 2025-08-20 VITALS
HEART RATE: 73 BPM | WEIGHT: 182 LBS | RESPIRATION RATE: 16 BRPM | BODY MASS INDEX: 28.56 KG/M2 | DIASTOLIC BLOOD PRESSURE: 65 MMHG | SYSTOLIC BLOOD PRESSURE: 125 MMHG | OXYGEN SATURATION: 97 % | TEMPERATURE: 97 F | HEIGHT: 67 IN

## 2025-08-20 PROBLEM — N18.31 STAGE 3A CHRONIC KIDNEY DISEASE (MULTI): Status: ACTIVE | Noted: 2025-08-20

## 2025-08-20 RX ORDER — GUAIFENESIN 600 MG/1
1200 TABLET, EXTENDED RELEASE ORAL 2 TIMES DAILY
Qty: 120 TABLET | Refills: 11 | Status: SHIPPED | OUTPATIENT
Start: 2025-08-20 | End: 2026-08-20

## 2025-08-20 ASSESSMENT — ENCOUNTER SYMPTOMS
SINUS PRESSURE: 1
DEPRESSION: 0
CONSTITUTIONAL NEGATIVE: 1
LOSS OF SENSATION IN FEET: 1
OCCASIONAL FEELINGS OF UNSTEADINESS: 1
RHINORRHEA: 1
FREQUENCY: 1
ARTHRALGIAS: 1
GASTROINTESTINAL NEGATIVE: 1
PSYCHIATRIC NEGATIVE: 1
WEAKNESS: 1
COUGH: 1
EYES NEGATIVE: 1